# Patient Record
Sex: FEMALE | Race: BLACK OR AFRICAN AMERICAN | NOT HISPANIC OR LATINO | Employment: OTHER | URBAN - METROPOLITAN AREA
[De-identification: names, ages, dates, MRNs, and addresses within clinical notes are randomized per-mention and may not be internally consistent; named-entity substitution may affect disease eponyms.]

---

## 2023-02-28 PROBLEM — R06.09 DYSPNEA ON EXERTION: Status: ACTIVE | Noted: 2023-02-28

## 2023-02-28 PROBLEM — M54.6 THORACIC BACK PAIN: Status: ACTIVE | Noted: 2023-02-28

## 2023-02-28 PROBLEM — M25.552 LEFT HIP PAIN: Status: ACTIVE | Noted: 2023-02-28

## 2023-02-28 PROBLEM — H52.03 HYPEROPIA OF BOTH EYES: Status: ACTIVE | Noted: 2023-02-28

## 2023-02-28 PROBLEM — J30.2 SEASONAL ALLERGIC RHINITIS: Status: ACTIVE | Noted: 2023-02-28

## 2023-02-28 PROBLEM — H40.1190 CHRONIC OPEN ANGLE GLAUCOMA: Status: ACTIVE | Noted: 2023-02-28

## 2023-02-28 PROBLEM — H01.003 BLEPHARITIS, BOTH EYES: Status: ACTIVE | Noted: 2023-02-28

## 2023-02-28 PROBLEM — H52.00 HYPEROPIA: Status: ACTIVE | Noted: 2023-02-28

## 2023-02-28 PROBLEM — S46.911A STRAIN OF RIGHT SHOULDER: Status: ACTIVE | Noted: 2023-02-28

## 2023-02-28 PROBLEM — R73.09 ELEVATED GLUCOSE: Status: ACTIVE | Noted: 2023-02-28

## 2023-02-28 PROBLEM — M25.569 KNEE PAIN: Status: ACTIVE | Noted: 2023-02-28

## 2023-02-28 PROBLEM — G62.9 NEUROPATHY: Status: ACTIVE | Noted: 2023-02-28

## 2023-02-28 PROBLEM — H01.006 BLEPHARITIS, BOTH EYES: Status: ACTIVE | Noted: 2023-02-28

## 2023-02-28 PROBLEM — H25.812 COMBINED FORM OF AGE-RELATED CATARACT, LEFT EYE: Status: ACTIVE | Noted: 2023-02-28

## 2023-02-28 PROBLEM — M13.162 INFLAMMATION OF JOINT OF LEFT KNEE: Status: ACTIVE | Noted: 2023-02-28

## 2023-02-28 PROBLEM — I25.10 ARTERIOSCLEROTIC CORONARY ARTERY DISEASE: Status: ACTIVE | Noted: 2023-02-28

## 2023-02-28 PROBLEM — R00.1 SINUS BRADYCARDIA: Status: ACTIVE | Noted: 2023-02-28

## 2023-02-28 PROBLEM — M17.9 OSTEOARTHRITIS OF KNEE: Status: ACTIVE | Noted: 2023-02-28

## 2023-02-28 PROBLEM — M54.50 LOW BACK PAIN: Status: ACTIVE | Noted: 2023-02-28

## 2023-02-28 PROBLEM — E78.5 HYPERLIPIDEMIA: Status: ACTIVE | Noted: 2023-02-28

## 2023-02-28 PROBLEM — H10.13 ALLERGIC CONJUNCTIVITIS OF BOTH EYES: Status: ACTIVE | Noted: 2023-02-28

## 2023-02-28 PROBLEM — Z96.60 HISTORY OF JOINT REPLACEMENT: Status: ACTIVE | Noted: 2023-02-28

## 2023-02-28 PROBLEM — H25.811 COMBINED FORM OF AGE-RELATED CATARACT, RIGHT EYE: Status: ACTIVE | Noted: 2023-02-28

## 2023-02-28 PROBLEM — H40.9 GLAUCOMA: Status: ACTIVE | Noted: 2023-02-28

## 2023-02-28 PROBLEM — K80.20 CHOLELITHIASIS: Status: ACTIVE | Noted: 2023-02-28

## 2023-02-28 PROBLEM — H40.2220 CHRONIC PRIMARY ANGLE-CLOSURE GLAUCOMA OF LEFT EYE: Status: ACTIVE | Noted: 2023-02-28

## 2023-02-28 PROBLEM — R93.1 AGATSTON CORONARY ARTERY CALCIUM SCORE BETWEEN 200 AND 399: Status: ACTIVE | Noted: 2023-02-28

## 2023-02-28 PROBLEM — I35.8 AORTIC VALVE SCLEROSIS: Status: ACTIVE | Noted: 2023-02-28

## 2023-02-28 PROBLEM — M54.16 LUMBAR RADICULOPATHY: Status: ACTIVE | Noted: 2023-02-28

## 2023-02-28 PROBLEM — H40.1123 PRIMARY OPEN ANGLE GLAUCOMA OF LEFT EYE, SEVERE STAGE: Status: ACTIVE | Noted: 2023-02-28

## 2023-02-28 PROBLEM — I10 HYPERTENSION: Status: ACTIVE | Noted: 2023-02-28

## 2023-02-28 PROBLEM — H40.2233 CHRONIC PRIMARY ANGLE-CLOSURE GLAUCOMA OF BOTH EYES, SEVERE STAGE: Status: ACTIVE | Noted: 2023-02-28

## 2023-02-28 PROBLEM — H40.1113 PRIMARY OPEN ANGLE GLAUCOMA OF RIGHT EYE, SEVERE STAGE: Status: ACTIVE | Noted: 2023-02-28

## 2023-02-28 RX ORDER — FLUTICASONE PROPIONATE 50 MCG
1 SPRAY, SUSPENSION (ML) NASAL DAILY
COMMUNITY
Start: 2021-08-09 | End: 2024-04-09 | Stop reason: SDUPTHER

## 2023-02-28 RX ORDER — NIFEDIPINE 30 MG/1
1 TABLET, FILM COATED, EXTENDED RELEASE ORAL DAILY
COMMUNITY
Start: 2022-06-16 | End: 2023-06-09 | Stop reason: SDUPTHER

## 2023-02-28 RX ORDER — LOSARTAN POTASSIUM 50 MG/1
1 TABLET ORAL DAILY
COMMUNITY
Start: 2022-06-16 | End: 2023-06-09 | Stop reason: SDUPTHER

## 2023-02-28 RX ORDER — HYDROCHLOROTHIAZIDE 25 MG/1
1 TABLET ORAL DAILY
COMMUNITY
Start: 2018-01-04 | End: 2024-02-13 | Stop reason: SDUPTHER

## 2023-02-28 RX ORDER — TIMOLOL MALEATE 5 MG/ML
SOLUTION/ DROPS OPHTHALMIC
COMMUNITY
Start: 2022-02-19 | End: 2024-05-20 | Stop reason: SDUPTHER

## 2023-02-28 RX ORDER — ATENOLOL 25 MG/1
1 TABLET ORAL DAILY
COMMUNITY
Start: 2011-11-10 | End: 2024-02-12 | Stop reason: SDUPTHER

## 2023-02-28 RX ORDER — LATANOPROST 50 UG/ML
1 SOLUTION/ DROPS OPHTHALMIC NIGHTLY
COMMUNITY
Start: 2015-08-06 | End: 2024-05-20 | Stop reason: SDUPTHER

## 2023-02-28 RX ORDER — ASPIRIN 81 MG/1
1 TABLET ORAL DAILY
COMMUNITY
Start: 2014-09-11

## 2023-02-28 RX ORDER — CETIRIZINE HYDROCHLORIDE 10 MG/1
10 TABLET ORAL EVERY MORNING
COMMUNITY
Start: 2018-04-19 | End: 2023-05-09 | Stop reason: SDUPTHER

## 2023-02-28 RX ORDER — ATORVASTATIN CALCIUM 80 MG/1
1 TABLET, FILM COATED ORAL DAILY
COMMUNITY
End: 2023-10-11 | Stop reason: SDUPTHER

## 2023-03-10 ENCOUNTER — OFFICE VISIT (OUTPATIENT)
Dept: PRIMARY CARE | Facility: CLINIC | Age: 83
End: 2023-03-10
Payer: MEDICARE

## 2023-03-10 VITALS
OXYGEN SATURATION: 98 % | BODY MASS INDEX: 27.83 KG/M2 | DIASTOLIC BLOOD PRESSURE: 69 MMHG | SYSTOLIC BLOOD PRESSURE: 148 MMHG | HEART RATE: 53 BPM | TEMPERATURE: 98.4 F | WEIGHT: 163 LBS | HEIGHT: 64 IN

## 2023-03-10 DIAGNOSIS — F43.21 GRIEF AT LOSS OF CHILD: Primary | ICD-10-CM

## 2023-03-10 DIAGNOSIS — Z63.4 GRIEF AT LOSS OF CHILD: Primary | ICD-10-CM

## 2023-03-10 PROCEDURE — 3077F SYST BP >= 140 MM HG: CPT | Performed by: STUDENT IN AN ORGANIZED HEALTH CARE EDUCATION/TRAINING PROGRAM

## 2023-03-10 PROCEDURE — 3078F DIAST BP <80 MM HG: CPT | Performed by: STUDENT IN AN ORGANIZED HEALTH CARE EDUCATION/TRAINING PROGRAM

## 2023-03-10 PROCEDURE — 99213 OFFICE O/P EST LOW 20 MIN: CPT | Performed by: STUDENT IN AN ORGANIZED HEALTH CARE EDUCATION/TRAINING PROGRAM

## 2023-03-10 SDOH — SOCIAL STABILITY - SOCIAL INSECURITY: DISSAPEARANCE AND DEATH OF FAMILY MEMBER: Z63.4

## 2023-03-10 ASSESSMENT — PAIN SCALES - GENERAL: PAINLEVEL: 0-NO PAIN

## 2023-03-10 NOTE — PROGRESS NOTES
"Subjective   Patient ID: Melita Raya is a 82 y.o. female who presents for Follow-up.    HPI  80yo female with a pmh of HTN, aortic valve stenosis, cholelithiasis, chronic open angle glaucoma, bilateral knee replacement, hyperlipidemia, and seasonal allergic rhinitis/sinusitis presenting for follow-up for acute grief reaction:    #Acute Grief Reaction  - Daughter, Eloisa, passed away mid-February and was primary caretaker  - Currently staying with son who is assisting her with household  - Tearful as she describes her daughter's chronic illness    Review of Systems   Constitutional:  Negative for chills and fever.   HENT:  Negative for congestion.    Eyes:  Negative for pain.   Respiratory:  Negative for cough, chest tightness, shortness of breath and wheezing.    Cardiovascular:  Negative for chest pain, palpitations and leg swelling.   Gastrointestinal:  Negative for abdominal distention, abdominal pain, constipation, diarrhea, nausea and vomiting.   Genitourinary:  Negative for dysuria.   Musculoskeletal:  Negative for arthralgias, back pain, joint swelling and myalgias.   Skin:  Negative for color change and rash.   Neurological:  Negative for weakness, numbness and headaches.   Psychiatric/Behavioral:  Negative for behavioral problems.        Objective     /69 (BP Location: Right arm, Patient Position: Sitting)   Pulse 53   Temp 36.9 °C (98.4 °F) (Temporal)   Ht 1.626 m (5' 4\")   Wt 73.9 kg (163 lb)   SpO2 98%   BMI 27.98 kg/m²      General: clean hygiene, appropriate affect, appears own age, no acute distress  Head: no scars/bumps, no deformities, no trauma  Eyes: EOMI, pupils are equal, round, and reactive to light  ENT: no lymphadenopathy, trachea midline  Cardiac: regular rate/rhythm, no murmurs/rubs/gallops  Pulm: clear to auscultation, bilateral entry/effort appropriate, no wheezes/rales/rhonchi  Abdominal: bowel sounds appreciated, abdomen soft/non-tender, no distention/fluid, no " hepatomegaly, no splenomegaly  : DEFERRED  Skin: No scars, no bruises, no rashes, no masses  Mental Status: awake, alert, oriented x 4, normal memory  MSK: appropriate muscle tone/bulk, active and passive ROM upper extremities and lower extremities     Assessment/Plan   Problem List Items Addressed This Visit    None    82yo female with a pmh of HTN, aortic valve stenosis, cholelithiasis, chronic open angle glaucoma, bilateral knee replacement, hyperlipidemia, and seasonal allergic rhinitis/sinusitis presenting for follow-up for acute grief reaction:    #Acute Grief Reaction  - Currently staying with son who is assisting her with household  - Not amendable to therapy or medical treatment at this time  - Discussed methods to help with stress relief    Plan to follow-up in 1 month     Patient discussed with Dr. Fred Howell MD   Resident Physician, PGY3  Family and Preventive Medicine

## 2023-03-14 ASSESSMENT — ENCOUNTER SYMPTOMS
DYSURIA: 0
HEADACHES: 0
ABDOMINAL PAIN: 0
FEVER: 0
COLOR CHANGE: 0
EYE PAIN: 0
PALPITATIONS: 0
JOINT SWELLING: 0
WHEEZING: 0
ARTHRALGIAS: 0
COUGH: 0
CHEST TIGHTNESS: 0
VOMITING: 0
CHILLS: 0
DIARRHEA: 0
NAUSEA: 0
SHORTNESS OF BREATH: 0
WEAKNESS: 0
MYALGIAS: 0
BACK PAIN: 0
NUMBNESS: 0
ABDOMINAL DISTENTION: 0
CONSTIPATION: 0

## 2023-03-14 NOTE — PROGRESS NOTES
I reviewed the case with the resident but did not see the patient.  I agree with the assessment and plan as documented in the resident's note.    Hemanth Ibarra MD

## 2023-04-18 ENCOUNTER — OFFICE VISIT (OUTPATIENT)
Dept: PRIMARY CARE | Facility: CLINIC | Age: 83
End: 2023-04-18
Payer: MEDICARE

## 2023-04-18 VITALS
HEIGHT: 64 IN | OXYGEN SATURATION: 100 % | TEMPERATURE: 97.7 F | WEIGHT: 166.2 LBS | HEART RATE: 44 BPM | BODY MASS INDEX: 28.38 KG/M2 | DIASTOLIC BLOOD PRESSURE: 71 MMHG | SYSTOLIC BLOOD PRESSURE: 149 MMHG

## 2023-04-18 DIAGNOSIS — G89.29 CHRONIC PAIN OF LEFT KNEE: Primary | ICD-10-CM

## 2023-04-18 DIAGNOSIS — M25.562 CHRONIC PAIN OF LEFT KNEE: Primary | ICD-10-CM

## 2023-04-18 DIAGNOSIS — M54.50 LOW BACK PAIN RADIATING TO LEFT LOWER EXTREMITY: ICD-10-CM

## 2023-04-18 DIAGNOSIS — M79.605 LOW BACK PAIN RADIATING TO LEFT LOWER EXTREMITY: ICD-10-CM

## 2023-04-18 PROCEDURE — 1036F TOBACCO NON-USER: CPT | Performed by: STUDENT IN AN ORGANIZED HEALTH CARE EDUCATION/TRAINING PROGRAM

## 2023-04-18 PROCEDURE — 1159F MED LIST DOCD IN RCRD: CPT | Performed by: STUDENT IN AN ORGANIZED HEALTH CARE EDUCATION/TRAINING PROGRAM

## 2023-04-18 PROCEDURE — 3078F DIAST BP <80 MM HG: CPT | Performed by: STUDENT IN AN ORGANIZED HEALTH CARE EDUCATION/TRAINING PROGRAM

## 2023-04-18 PROCEDURE — 3077F SYST BP >= 140 MM HG: CPT | Performed by: STUDENT IN AN ORGANIZED HEALTH CARE EDUCATION/TRAINING PROGRAM

## 2023-04-18 PROCEDURE — 1157F ADVNC CARE PLAN IN RCRD: CPT | Performed by: STUDENT IN AN ORGANIZED HEALTH CARE EDUCATION/TRAINING PROGRAM

## 2023-04-18 PROCEDURE — 99213 OFFICE O/P EST LOW 20 MIN: CPT | Performed by: STUDENT IN AN ORGANIZED HEALTH CARE EDUCATION/TRAINING PROGRAM

## 2023-04-18 ASSESSMENT — ENCOUNTER SYMPTOMS
WEAKNESS: 0
NAUSEA: 0
JOINT SWELLING: 0
HEADACHES: 0
CONSTIPATION: 0
ABDOMINAL DISTENTION: 0
MYALGIAS: 1
COLOR CHANGE: 0
CHILLS: 0
CHEST TIGHTNESS: 0
EYE PAIN: 0
FEVER: 0
VOMITING: 0
SHORTNESS OF BREATH: 0
ARTHRALGIAS: 1
COUGH: 0
DYSURIA: 0
NUMBNESS: 0
ABDOMINAL PAIN: 0
BACK PAIN: 1
PALPITATIONS: 0
DIARRHEA: 0
WHEEZING: 0

## 2023-04-18 ASSESSMENT — PAIN SCALES - GENERAL: PAINLEVEL: 0-NO PAIN

## 2023-04-18 NOTE — PROGRESS NOTES
"Subjective   Patient ID: Melita Raya is a 82 y.o. female who presents for Follow-up.    HPI    83yo female with a pmh of HTN, aortic valve stenosis, cholelithiasis, chronic open angle glaucoma, bilateral knee replacement, hyperlipidemia, and seasonal allergic rhinitis/sinusitis presenting for follow-up of acute on chronic left knee pain and lower left back pain. Pain is getting worse alternating with Tylenol and Ibuprofen.     #Left Lower Back Pain  #Left Leg Pain  - Feels every day like dull pain  - Feels it the worst after she has walked up and down stairs for a while; feels stiff in the morning but better after stretches  - Left hip xray showed osteoarthritis  - Takes ibuprofen for 1 week during flares    #Left Knee Pain  - Feels every day  - Has had prior knee replacements bilaterally, with right knee feeling well  - There is no swelling  - No fever, chills, SOB, CP, Heart Palpitations, no recent illnesses    Review of Systems   Constitutional:  Negative for chills and fever.   HENT:  Negative for congestion.    Eyes:  Negative for pain.   Respiratory:  Negative for cough, chest tightness, shortness of breath and wheezing.    Cardiovascular:  Negative for chest pain, palpitations and leg swelling.   Gastrointestinal:  Negative for abdominal distention, abdominal pain, constipation, diarrhea, nausea and vomiting.   Genitourinary:  Negative for dysuria.   Musculoskeletal:  Positive for arthralgias, back pain and myalgias. Negative for joint swelling.   Skin:  Negative for color change and rash.   Neurological:  Negative for weakness, numbness and headaches.   Psychiatric/Behavioral:  Negative for behavioral problems.        Objective     /71 (BP Location: Left arm, Patient Position: Sitting)   Pulse (!) 44   Temp 36.5 °C (97.7 °F) (Temporal)   Ht 1.626 m (5' 4\")   Wt 75.4 kg (166 lb 3.2 oz)   SpO2 100%   BMI 28.53 kg/m²      General: clean hygiene, appropriate affect, appears own age, no acute " distress  Head: no scars/bumps, no deformities, no trauma  Eyes: EOMI, pupils are equal, round, and reactive to light  ENT: no lymphadenopathy, trachea midline  Cardiac: regular rate/rhythm, no murmurs/rubs/gallops  Pulm: clear to auscultation, bilateral entry/effort appropriate, no wheezes/rales/rhonchi  Abdominal: bowel sounds appreciated, abdomen soft/non-tender, no distention/fluid, no hepatomegaly, no splenomegaly  : DEFERRED  Skin: No scars, no bruises, no rashes, no masses  Mental Status: awake, alert, oriented x 4, normal memory  MSK: appropriate muscle tone/bulk, active and passive ROM upper extremities and lower extremities     Assessment/Plan   Problem List Items Addressed This Visit    None    82yo female with a pmh of HTN, aortic valve stenosis, cholelithiasis, chronic open angle glaucoma, bilateral knee replacement, hyperlipidemia, and seasonal allergic rhinitis/sinusitis presenting for follow-up of acute on chronic left knee pain and lower left back pain:    #Left Lower Back Pain  #Left Leg Pain  - C/w Tylenol and Ibuprofen PRN for pain  - C/w Lidocaine patches  - Sparingly using Ibuprofen for flares  - Referral to Physical Therapy and work on stretching exercises    #Left Knee Pain  - S/p Knee replacement in late 2000s  - C/w Tylenol and Ibuprofen PRN for pain  - C/w Lidocaine patches  - Sparingly using Ibuprofen for flares  - Referral to Physical Therapy and work on stretching exercises  - Referral to Ortho Knee    Plan to follow-up in 1 month     Patient discussed with Dr. Herbert Howell MD   Resident Physician, PGY3  Family and Preventive Medicine     Physical Exam  Constitutional:       Appearance: Normal appearance. She is normal weight.   HENT:      Head: Normocephalic and atraumatic.      Nose: Nose normal.   Eyes:      Extraocular Movements: Extraocular movements intact.      Conjunctiva/sclera: Conjunctivae normal.      Pupils: Pupils are equal, round, and reactive to light.    Cardiovascular:      Rate and Rhythm: Normal rate and regular rhythm.      Pulses: Normal pulses.      Heart sounds: Normal heart sounds.   Pulmonary:      Effort: Pulmonary effort is normal.      Breath sounds: Normal breath sounds.   Abdominal:      General: Abdomen is flat. Bowel sounds are normal.      Palpations: Abdomen is soft.   Musculoskeletal:         General: Tenderness present. No swelling, deformity or signs of injury. Normal range of motion.      Cervical back: Normal range of motion and neck supple.      Right lower leg: No edema.      Left lower leg: No edema.   Skin:     General: Skin is warm and dry.      Capillary Refill: Capillary refill takes less than 2 seconds.   Neurological:      General: No focal deficit present.      Mental Status: She is alert and oriented to person, place, and time. Mental status is at baseline.   Psychiatric:         Mood and Affect: Mood normal.         Behavior: Behavior normal.

## 2023-04-18 NOTE — PROGRESS NOTES
I reviewed with the resident the medical history and the resident’s findings on physical examination.  I discussed with the resident the patient’s diagnosis and concur with the treatment plan as documented in the resident note.     William Godinez MD

## 2023-05-09 DIAGNOSIS — J30.9 ALLERGIC RHINITIS, UNSPECIFIED SEASONALITY, UNSPECIFIED TRIGGER: Primary | ICD-10-CM

## 2023-05-09 RX ORDER — CETIRIZINE HYDROCHLORIDE 10 MG/1
10 TABLET ORAL EVERY MORNING
Qty: 90 TABLET | Refills: 3 | Status: SHIPPED | OUTPATIENT
Start: 2023-05-09

## 2023-06-09 DIAGNOSIS — I10 PRIMARY HYPERTENSION: Primary | ICD-10-CM

## 2023-06-11 RX ORDER — LOSARTAN POTASSIUM 50 MG/1
50 TABLET ORAL DAILY
Qty: 90 TABLET | Refills: 3 | Status: SHIPPED | OUTPATIENT
Start: 2023-06-11 | End: 2024-05-31 | Stop reason: SDUPTHER

## 2023-06-11 RX ORDER — NIFEDIPINE 30 MG/1
30 TABLET, FILM COATED, EXTENDED RELEASE ORAL DAILY
Qty: 90 TABLET | Refills: 3 | Status: SHIPPED | OUTPATIENT
Start: 2023-06-11 | End: 2024-05-31 | Stop reason: SDUPTHER

## 2023-06-13 ENCOUNTER — OFFICE VISIT (OUTPATIENT)
Dept: PRIMARY CARE | Facility: CLINIC | Age: 83
End: 2023-06-13
Payer: MEDICARE

## 2023-06-13 VITALS
DIASTOLIC BLOOD PRESSURE: 75 MMHG | BODY MASS INDEX: 28.85 KG/M2 | WEIGHT: 169 LBS | SYSTOLIC BLOOD PRESSURE: 159 MMHG | TEMPERATURE: 97.3 F | OXYGEN SATURATION: 99 % | HEIGHT: 64 IN | HEART RATE: 47 BPM

## 2023-06-13 DIAGNOSIS — M25.562 CHRONIC PAIN OF LEFT KNEE: Primary | ICD-10-CM

## 2023-06-13 DIAGNOSIS — M79.605 LOW BACK PAIN RADIATING TO LEFT LOWER EXTREMITY: ICD-10-CM

## 2023-06-13 DIAGNOSIS — G89.29 CHRONIC PAIN OF LEFT KNEE: Primary | ICD-10-CM

## 2023-06-13 DIAGNOSIS — M54.50 LOW BACK PAIN RADIATING TO LEFT LOWER EXTREMITY: ICD-10-CM

## 2023-06-13 PROCEDURE — 1159F MED LIST DOCD IN RCRD: CPT | Performed by: STUDENT IN AN ORGANIZED HEALTH CARE EDUCATION/TRAINING PROGRAM

## 2023-06-13 PROCEDURE — 3078F DIAST BP <80 MM HG: CPT | Performed by: STUDENT IN AN ORGANIZED HEALTH CARE EDUCATION/TRAINING PROGRAM

## 2023-06-13 PROCEDURE — 1157F ADVNC CARE PLAN IN RCRD: CPT | Performed by: STUDENT IN AN ORGANIZED HEALTH CARE EDUCATION/TRAINING PROGRAM

## 2023-06-13 PROCEDURE — 99213 OFFICE O/P EST LOW 20 MIN: CPT | Performed by: STUDENT IN AN ORGANIZED HEALTH CARE EDUCATION/TRAINING PROGRAM

## 2023-06-13 PROCEDURE — 1036F TOBACCO NON-USER: CPT | Performed by: STUDENT IN AN ORGANIZED HEALTH CARE EDUCATION/TRAINING PROGRAM

## 2023-06-13 PROCEDURE — 3077F SYST BP >= 140 MM HG: CPT | Performed by: STUDENT IN AN ORGANIZED HEALTH CARE EDUCATION/TRAINING PROGRAM

## 2023-06-13 ASSESSMENT — ENCOUNTER SYMPTOMS
JOINT SWELLING: 0
ABDOMINAL PAIN: 0
VOMITING: 0
COLOR CHANGE: 0
CONSTIPATION: 0
BACK PAIN: 1
DYSURIA: 0
MYALGIAS: 1
NUMBNESS: 0
EYE PAIN: 0
WEAKNESS: 0
NAUSEA: 0
FEVER: 0
CHILLS: 0
ARTHRALGIAS: 1
WHEEZING: 0
SHORTNESS OF BREATH: 0
DIARRHEA: 0
COUGH: 0
HEADACHES: 0
ABDOMINAL DISTENTION: 0
CHEST TIGHTNESS: 0
PALPITATIONS: 0

## 2023-06-13 ASSESSMENT — PAIN SCALES - GENERAL: PAINLEVEL: 6

## 2023-06-13 NOTE — PROGRESS NOTES
"Subjective   Patient ID: Melita Raya is a 82 y.o. female who presents for Follow-up.    HPI    81yo female with a pmh of HTN, aortic valve stenosis, cholelithiasis, chronic open angle glaucoma, bilateral knee replacement, hyperlipidemia, and seasonal allergic rhinitis/sinusitis presenting for follow-up of acute on chronic left knee pain and lower left back pain. Pain is primarily managed alternating with Tylenol and Ibuprofen.     #Left Lower Back Pain  #Left Leg Pain  - Feels every day like dull pain  - Feels it the worst after she has walked up and down stairs for a while; feels stiff in the morning but better after stretches  - Left hip xray showed osteoarthritis  - Takes ibuprofen for 1 week during flares but not daily    #Left Knee Pain  - Feels every day  - Has had prior knee replacements bilaterally, with right knee feeling well  - There is no swelling  - No fever, chills, SOB, CP, Heart Palpitations, no recent illnesses    Review of Systems   Constitutional:  Negative for chills and fever.   HENT:  Negative for congestion.    Eyes:  Negative for pain.   Respiratory:  Negative for cough, chest tightness, shortness of breath and wheezing.    Cardiovascular:  Negative for chest pain, palpitations and leg swelling.   Gastrointestinal:  Negative for abdominal distention, abdominal pain, constipation, diarrhea, nausea and vomiting.   Genitourinary:  Negative for dysuria.   Musculoskeletal:  Positive for arthralgias, back pain and myalgias. Negative for joint swelling.   Skin:  Negative for color change and rash.   Neurological:  Negative for weakness, numbness and headaches.   Psychiatric/Behavioral:  Negative for behavioral problems.        Objective     /75 (BP Location: Right arm, Patient Position: Sitting)   Pulse (!) 47   Temp 36.3 °C (97.3 °F) (Temporal)   Ht 1.626 m (5' 4\")   Wt 76.7 kg (169 lb)   SpO2 99%   BMI 29.01 kg/m²      General: clean hygiene, appropriate affect, appears own age, " no acute distress  Head: no scars/bumps, no deformities, no trauma  Eyes: EOMI, pupils are equal, round, and reactive to light  ENT: no lymphadenopathy, trachea midline  Cardiac: regular rate/rhythm, no murmurs/rubs/gallops  Pulm: clear to auscultation, bilateral entry/effort appropriate, no wheezes/rales/rhonchi  Abdominal: bowel sounds appreciated, abdomen soft/non-tender, no distention/fluid, no hepatomegaly, no splenomegaly  : DEFERRED  Skin: No scars, no bruises, no rashes, no masses  Mental Status: awake, alert, oriented x 4, normal memory  MSK: appropriate muscle tone/bulk, active and passive ROM upper extremities and lower extremities     Assessment/Plan   Problem List Items Addressed This Visit    None    82yo female with a pmh of HTN, aortic valve stenosis, cholelithiasis, chronic open angle glaucoma, bilateral knee replacement, hyperlipidemia, and seasonal allergic rhinitis/sinusitis presenting for follow-up of acute on chronic left knee pain and lower left back pain:    #Left Lower Back Pain  #Left Leg Pain  - C/w Tylenol and Ibuprofen PRN for pain  - C/w Lidocaine patches  - Sparingly using Ibuprofen for flares  - Referral to Physical Therapy and work on stretching exercises    #Left Knee Pain  - S/p Knee replacement in late 2000s  - C/w Tylenol and Ibuprofen PRN for pain  - C/w Lidocaine patches  - Sparingly using Ibuprofen for flares  - Referral to Physical Therapy and work on stretching exercises pending scheduling  - Referral to Ortho Knee pending scheduling    #HTN  - Will follow-up with Cardiology next month regarding HTN medications    Plan to follow-up in 3 month     Patient discussed with Dr. Fred Howell MD   Resident Physician, PGY3  Family and Preventive Medicine     Physical Exam  Constitutional:       Appearance: Normal appearance. She is normal weight.   HENT:      Head: Normocephalic and atraumatic.      Nose: Nose normal.   Eyes:      Extraocular Movements: Extraocular  movements intact.      Conjunctiva/sclera: Conjunctivae normal.      Pupils: Pupils are equal, round, and reactive to light.   Cardiovascular:      Rate and Rhythm: Normal rate and regular rhythm.      Pulses: Normal pulses.      Heart sounds: Normal heart sounds.   Pulmonary:      Effort: Pulmonary effort is normal.      Breath sounds: Normal breath sounds.   Abdominal:      General: Abdomen is flat. Bowel sounds are normal.      Palpations: Abdomen is soft.   Musculoskeletal:         General: Tenderness present. No swelling, deformity or signs of injury. Normal range of motion.      Cervical back: Normal range of motion and neck supple.      Right lower leg: No edema.      Left lower leg: No edema.   Skin:     General: Skin is warm and dry.      Capillary Refill: Capillary refill takes less than 2 seconds.   Neurological:      General: No focal deficit present.      Mental Status: She is alert and oriented to person, place, and time. Mental status is at baseline.   Psychiatric:         Mood and Affect: Mood normal.         Behavior: Behavior normal.

## 2023-06-19 NOTE — PROGRESS NOTES
I reviewed with the resident the medical history and the resident’s findings on physical examination.  I discussed with the resident the patient’s diagnosis and concur with the treatment plan as documented in the resident note.     Hemanth Ibarra MD

## 2023-07-11 ENCOUNTER — OFFICE VISIT (OUTPATIENT)
Dept: PRIMARY CARE | Facility: CLINIC | Age: 83
End: 2023-07-11
Payer: MEDICARE

## 2023-07-11 VITALS
BODY MASS INDEX: 27.76 KG/M2 | SYSTOLIC BLOOD PRESSURE: 140 MMHG | DIASTOLIC BLOOD PRESSURE: 60 MMHG | HEART RATE: 52 BPM | WEIGHT: 162.6 LBS | HEIGHT: 64 IN | OXYGEN SATURATION: 99 %

## 2023-07-11 DIAGNOSIS — R73.03 PREDIABETES: ICD-10-CM

## 2023-07-11 DIAGNOSIS — E78.2 MIXED HYPERLIPIDEMIA: ICD-10-CM

## 2023-07-11 DIAGNOSIS — G89.29 CHRONIC PAIN OF LEFT KNEE: ICD-10-CM

## 2023-07-11 DIAGNOSIS — R53.83 OTHER FATIGUE: ICD-10-CM

## 2023-07-11 DIAGNOSIS — I35.8 AORTIC VALVE SCLEROSIS: ICD-10-CM

## 2023-07-11 DIAGNOSIS — M25.562 CHRONIC PAIN OF LEFT KNEE: ICD-10-CM

## 2023-07-11 DIAGNOSIS — R53.83 DECREASED ENERGY: ICD-10-CM

## 2023-07-11 DIAGNOSIS — E55.9 VITAMIN D DEFICIENCY: ICD-10-CM

## 2023-07-11 DIAGNOSIS — I10 PRIMARY HYPERTENSION: ICD-10-CM

## 2023-07-11 DIAGNOSIS — Z76.89 ENCOUNTER TO ESTABLISH CARE: Primary | ICD-10-CM

## 2023-07-11 LAB
ALANINE AMINOTRANSFERASE (SGPT) (U/L) IN SER/PLAS: 14 U/L (ref 7–45)
ALBUMIN (G/DL) IN SER/PLAS: 4.5 G/DL (ref 3.4–5)
ALKALINE PHOSPHATASE (U/L) IN SER/PLAS: 77 U/L (ref 33–136)
ANION GAP IN SER/PLAS: 16 MMOL/L (ref 10–20)
ASPARTATE AMINOTRANSFERASE (SGOT) (U/L) IN SER/PLAS: 21 U/L (ref 9–39)
BILIRUBIN TOTAL (MG/DL) IN SER/PLAS: 0.6 MG/DL (ref 0–1.2)
CALCIDIOL (25 OH VITAMIN D3) (NG/ML) IN SER/PLAS: 11 NG/ML
CALCIUM (MG/DL) IN SER/PLAS: 10.2 MG/DL (ref 8.6–10.6)
CARBON DIOXIDE, TOTAL (MMOL/L) IN SER/PLAS: 24 MMOL/L (ref 21–32)
CHLORIDE (MMOL/L) IN SER/PLAS: 105 MMOL/L (ref 98–107)
CHOLESTEROL (MG/DL) IN SER/PLAS: 226 MG/DL (ref 0–199)
CHOLESTEROL IN HDL (MG/DL) IN SER/PLAS: 88 MG/DL
CHOLESTEROL/HDL RATIO: 2.6
COBALAMIN (VITAMIN B12) (PG/ML) IN SER/PLAS: 521 PG/ML (ref 211–911)
CREATININE (MG/DL) IN SER/PLAS: 1.1 MG/DL (ref 0.5–1.05)
ERYTHROCYTE DISTRIBUTION WIDTH (RATIO) BY AUTOMATED COUNT: 12.8 % (ref 11.5–14.5)
ERYTHROCYTE MEAN CORPUSCULAR HEMOGLOBIN CONCENTRATION (G/DL) BY AUTOMATED: 32 G/DL (ref 32–36)
ERYTHROCYTE MEAN CORPUSCULAR VOLUME (FL) BY AUTOMATED COUNT: 93 FL (ref 80–100)
ERYTHROCYTES (10*6/UL) IN BLOOD BY AUTOMATED COUNT: 4.03 X10E12/L (ref 4–5.2)
ESTIMATED AVERAGE GLUCOSE FOR HBA1C: 108 MG/DL
FOLATE (NG/ML) IN SER/PLAS: 13.4 NG/ML
GFR FEMALE: 50 ML/MIN/1.73M2
GLUCOSE (MG/DL) IN SER/PLAS: 100 MG/DL (ref 74–99)
HEMATOCRIT (%) IN BLOOD BY AUTOMATED COUNT: 37.5 % (ref 36–46)
HEMOGLOBIN (G/DL) IN BLOOD: 12 G/DL (ref 12–16)
HEMOGLOBIN A1C/HEMOGLOBIN TOTAL IN BLOOD: 5.4 %
LDL: 122 MG/DL (ref 0–99)
LEUKOCYTES (10*3/UL) IN BLOOD BY AUTOMATED COUNT: 5.7 X10E9/L (ref 4.4–11.3)
NRBC (PER 100 WBCS) BY AUTOMATED COUNT: 0 /100 WBC (ref 0–0)
PLATELETS (10*3/UL) IN BLOOD AUTOMATED COUNT: 251 X10E9/L (ref 150–450)
POTASSIUM (MMOL/L) IN SER/PLAS: 4.4 MMOL/L (ref 3.5–5.3)
PROTEIN TOTAL: 7.4 G/DL (ref 6.4–8.2)
SODIUM (MMOL/L) IN SER/PLAS: 141 MMOL/L (ref 136–145)
THYROTROPIN (MIU/L) IN SER/PLAS BY DETECTION LIMIT <= 0.05 MIU/L: 1.38 MIU/L (ref 0.44–3.98)
TRIGLYCERIDE (MG/DL) IN SER/PLAS: 78 MG/DL (ref 0–149)
UREA NITROGEN (MG/DL) IN SER/PLAS: 29 MG/DL (ref 6–23)
VLDL: 16 MG/DL (ref 0–40)

## 2023-07-11 PROCEDURE — 1157F ADVNC CARE PLAN IN RCRD: CPT | Performed by: STUDENT IN AN ORGANIZED HEALTH CARE EDUCATION/TRAINING PROGRAM

## 2023-07-11 PROCEDURE — 1159F MED LIST DOCD IN RCRD: CPT | Performed by: STUDENT IN AN ORGANIZED HEALTH CARE EDUCATION/TRAINING PROGRAM

## 2023-07-11 PROCEDURE — 80061 LIPID PANEL: CPT

## 2023-07-11 PROCEDURE — 80053 COMPREHEN METABOLIC PANEL: CPT

## 2023-07-11 PROCEDURE — 84443 ASSAY THYROID STIM HORMONE: CPT

## 2023-07-11 PROCEDURE — 82607 VITAMIN B-12: CPT

## 2023-07-11 PROCEDURE — 99204 OFFICE O/P NEW MOD 45 MIN: CPT | Performed by: STUDENT IN AN ORGANIZED HEALTH CARE EDUCATION/TRAINING PROGRAM

## 2023-07-11 PROCEDURE — 82746 ASSAY OF FOLIC ACID SERUM: CPT

## 2023-07-11 PROCEDURE — 1036F TOBACCO NON-USER: CPT | Performed by: STUDENT IN AN ORGANIZED HEALTH CARE EDUCATION/TRAINING PROGRAM

## 2023-07-11 PROCEDURE — 83036 HEMOGLOBIN GLYCOSYLATED A1C: CPT

## 2023-07-11 PROCEDURE — 1160F RVW MEDS BY RX/DR IN RCRD: CPT | Performed by: STUDENT IN AN ORGANIZED HEALTH CARE EDUCATION/TRAINING PROGRAM

## 2023-07-11 PROCEDURE — 85027 COMPLETE CBC AUTOMATED: CPT

## 2023-07-11 PROCEDURE — 3077F SYST BP >= 140 MM HG: CPT | Performed by: STUDENT IN AN ORGANIZED HEALTH CARE EDUCATION/TRAINING PROGRAM

## 2023-07-11 PROCEDURE — 3078F DIAST BP <80 MM HG: CPT | Performed by: STUDENT IN AN ORGANIZED HEALTH CARE EDUCATION/TRAINING PROGRAM

## 2023-07-11 PROCEDURE — 82306 VITAMIN D 25 HYDROXY: CPT

## 2023-07-11 PROCEDURE — 1126F AMNT PAIN NOTED NONE PRSNT: CPT | Performed by: STUDENT IN AN ORGANIZED HEALTH CARE EDUCATION/TRAINING PROGRAM

## 2023-07-11 ASSESSMENT — PAIN SCALES - GENERAL: PAINLEVEL: 0-NO PAIN

## 2023-07-11 NOTE — PROGRESS NOTES
"Subjective   Patient ID: Melita Raya is a 82 y.o. female who presents for New Patient Visit (Establish care. ).        HPI  Est care   Pt's PMH, PSH, SH, FH , meds and allergies was obtained / reviewed and updated .   Was receiving care at resident clinic previously     83yo female with HTN, aortic valve stenosis, cholelithiasis, chronic open angle glaucoma, bilateral knee replacement, hyperlipidemia, and seasonal allergic rhinitis/sinusitis     Daughter passed away recently in Feb   Three adult children , now has 2 sons that are living   Son lives with her     Acute concerns :   Left knee pain   H./o  B/l knee replacement, remote     Upcoming appt with cards . Sees every 6m       Visit Vitals  /60   Pulse 52   Ht 1.626 m (5' 4\")   Wt 73.8 kg (162 lb 9.6 oz)   SpO2 99%   BMI 27.91 kg/m²   Smoking Status Never   BSA 1.83 m²      No LMP recorded.     Review of Systems    Constitutional : No feeling poorly / fevers/ chills / night sweats/ fatigue   Cardiovascular : No CP /Palpitations/ lower extremity edema / syncope   Respiratory : No Cough /TEIXEIRA/Dyspnea at rest   Gastrointestinal : No abd pain / N/V  No bloody stools/ melena / constipation  Endo : No polyuria/polydipsia/ muscle weakness / sluggishness   CNS: No confusion / HA/ tingling/ numbness/ weakness of extremities  Psychiatric: No anxiety/ depression/ SI/HI    All other systems have been reviewed and are negative for complaint       Physical Exam    Constitutional : Vitals reviewed. Alert and in no distress  Cardiovascular : RRR, Normal S1, S2, No pericardial rub/ gallop, no peripheral edema   Pulmonary: No respiratory distress, CTAB   MSK : Normal gait and station , strength and tone     Neurologic : CNs 2-12 grossly intact , no obvious FNDs  Psych : A,Ox3, normal mood and affect      Assessment/Plan   Diagnoses and all orders for this visit:  Encounter to establish care  Primary hypertension  -     CBC; Future  -     Comprehensive Metabolic Panel; " Future  Mixed hyperlipidemia  -     Lipid Panel; Future  Decreased energy  -     TSH with reflex to Free T4 if abnormal; Future  Other fatigue  Vitamin D deficiency  -     Vitamin D, Total; Future  -     Vitamin B12; Future  -     Folate; Future  Aortic valve sclerosis  Prediabetes  -     Hemoglobin A1C; Future  Chronic pain of left knee  -     Referral to Orthopaedic Surgery; Future         83yo female with HTN, aortic valve stenosis, cholelithiasis, chronic open angle glaucoma, bilateral knee replacement, hyperlipidemia, and seasonal allergic rhinitis/sinusitis   Age appropriate labs / labs for mgmt of chronic medical conditions ordered, further mgmt pending the results.        HTN ; no changes today   Will continue to monitor at future visits     H/O B/L knee replacement remotely with left knee pain   Xray Jan 2022 reviewed   Ortho referral     Fatigue for few months , could be from grieving of her daughter's death   R/o medical causes   Denied any depression     RTO  in 2-3 m for CPE

## 2023-07-13 ENCOUNTER — TELEPHONE (OUTPATIENT)
Dept: PRIMARY CARE | Facility: CLINIC | Age: 83
End: 2023-07-13
Payer: MEDICARE

## 2023-07-13 NOTE — TELEPHONE ENCOUNTER
----- Message from Yoly Valente MD sent at 7/13/2023  1:20 PM EDT -----  Vit D levels are low , high dose Vit D  04789 IUS to be taken once a week prescribed   After finishing high dose, need to take 2000 IUs of Vit D3 everyday - available over the counter .     Kidney function is mildly decreased, increase water intake, will recheck at fu visit     Cholesterol levels are elevated , has she been taking Atorvasttin 80mg regulalry?

## 2023-07-14 RX ORDER — ERGOCALCIFEROL 1.25 MG/1
50000 CAPSULE ORAL
COMMUNITY
End: 2023-10-18 | Stop reason: SDUPTHER

## 2023-07-14 RX ORDER — ERGOCALCIFEROL 1.25 MG/1
50000 CAPSULE ORAL
Qty: 12 CAPSULE | Refills: 0 | OUTPATIENT
Start: 2023-07-14

## 2023-07-14 NOTE — TELEPHONE ENCOUNTER
Patient is obtaining primary care from Dr. Fabiola Arita and this prescription should go to her for assessment.

## 2023-07-17 DIAGNOSIS — E55.9 VITAMIN D DEFICIENCY: Primary | ICD-10-CM

## 2023-07-17 RX ORDER — ASPIRIN 325 MG
50000 TABLET, DELAYED RELEASE (ENTERIC COATED) ORAL
Qty: 4 CAPSULE | Refills: 3 | Status: SHIPPED | OUTPATIENT
Start: 2023-07-17 | End: 2023-10-18 | Stop reason: ALTCHOICE

## 2023-10-02 ENCOUNTER — TREATMENT (OUTPATIENT)
Dept: PHYSICAL THERAPY | Facility: CLINIC | Age: 83
End: 2023-10-02
Payer: MEDICARE

## 2023-10-02 DIAGNOSIS — M54.50 LUMBAGO: Primary | ICD-10-CM

## 2023-10-02 DIAGNOSIS — G89.29 CHRONIC LOW BACK PAIN, UNSPECIFIED BACK PAIN LATERALITY, UNSPECIFIED WHETHER SCIATICA PRESENT: ICD-10-CM

## 2023-10-02 DIAGNOSIS — M54.16 LUMBAR RADICULOPATHY: ICD-10-CM

## 2023-10-02 DIAGNOSIS — M67.952 TENDINOPATHY OF LEFT GLUTEUS MEDIUS: ICD-10-CM

## 2023-10-02 DIAGNOSIS — M54.50 CHRONIC LOW BACK PAIN, UNSPECIFIED BACK PAIN LATERALITY, UNSPECIFIED WHETHER SCIATICA PRESENT: ICD-10-CM

## 2023-10-02 PROCEDURE — 97110 THERAPEUTIC EXERCISES: CPT | Mod: GP,CQ

## 2023-10-02 ASSESSMENT — ENCOUNTER SYMPTOMS
DEPRESSION: 0
OCCASIONAL FEELINGS OF UNSTEADINESS: 0
LOSS OF SENSATION IN FEET: 0

## 2023-10-02 NOTE — PROGRESS NOTES
"Physical Therapy    Physical Therapy Treatment    Patient Name: Melita Raya  MRN: [id number]  Today's Date: 10/2/2023  Time Calculation  Start Time: 1145  Stop Time: 1230  Time Calculation (min): 45 min      Assessment:  PT Assessment  Assessment Comment:  (Pt reported no pain after PRE's, increased w/ lat walking, abolished w/ Nu Step. R hip weakness noted w/ sit to stand.)    Plan:   Continue glute strength as tolerated     Current Problem  1. Lumbago        2. Tendinopathy of left gluteus medius        3. Lumbar radiculopathy            Subjective   General   \"I had a dance off with my 6 y/o granddaughter, and I am sore now.  L lateral hip/buttock               Objective   Palpable tenderneConss to L glute insertion area.                  Outcome Measures:      Treatments:  Therapeutic Exercise  Therapeutic Exercise Performed:  (Supine fig 4 ITB stretch 10x10\"/L, SKTC x 10 ea, DKTC x 20/Pball,Ball squeeze/black band Habd x 20 ea, Black band clams x 20 ea, sit to stand 2 foams, side stepping/red loop x 1, Nu Step- x 5 min)  Activity:      OP EDUCATION:       Goals:  Encounter Problems       Encounter Problems (Active)       PT Problem       .j       Start:  10/02/23                  "

## 2023-10-02 NOTE — Clinical Note
October 2, 2023     Patient: Melita Raya   YOB: 1940   Date of Visit: 10/2/2023       To Whom It May Concern:    It is my medical opinion that Melita Raya {Work release (duty restriction):65896}.    If you have any questions or concerns, please don't hesitate to call.         Sincerely,        Minerva Matos, PTA    CC: No Recipients

## 2023-10-02 NOTE — Clinical Note
October 2, 2023     Patient: Melita Raya   YOB: 1940   Date of Visit: 10/2/2023       To Whom it May Concern:    Melita Raya was seen in my clinic on 10/2/2023. She {Return to school/sport:88146}.    If you have any questions or concerns, please don't hesitate to call.         Sincerely,          Minerva Matos, PTA        CC: No Recipients

## 2023-10-09 ENCOUNTER — TREATMENT (OUTPATIENT)
Dept: PHYSICAL THERAPY | Facility: CLINIC | Age: 83
End: 2023-10-09
Payer: MEDICARE

## 2023-10-09 DIAGNOSIS — M54.50 LOW BACK PAIN, UNSPECIFIED BACK PAIN LATERALITY, UNSPECIFIED CHRONICITY, UNSPECIFIED WHETHER SCIATICA PRESENT: ICD-10-CM

## 2023-10-09 DIAGNOSIS — G89.29 CHRONIC LOW BACK PAIN, UNSPECIFIED BACK PAIN LATERALITY, UNSPECIFIED WHETHER SCIATICA PRESENT: Primary | ICD-10-CM

## 2023-10-09 DIAGNOSIS — M54.50 CHRONIC LOW BACK PAIN, UNSPECIFIED BACK PAIN LATERALITY, UNSPECIFIED WHETHER SCIATICA PRESENT: Primary | ICD-10-CM

## 2023-10-09 DIAGNOSIS — M67.952 TENDINOPATHY OF LEFT GLUTEUS MEDIUS: ICD-10-CM

## 2023-10-09 PROCEDURE — 97110 THERAPEUTIC EXERCISES: CPT | Mod: GP,CQ

## 2023-10-09 NOTE — PROGRESS NOTES
"Physical Therapy    Physical Therapy Treatment    Patient Name: Melita Raya  MRN: [id number]  Today's Date: 10/9/2023  Time Calculation  Start Time: 1100  Stop Time: 1200  Time Calculation (min): 60 min      Assessment:  PT Assessment  Assessment Comment:  (Pt was unable to bridge 2`ITB/glute pain)    Plan:   Glute/abd strength.  LAD as needed    Current Problem  1. Chronic low back pain, unspecified back pain laterality, unspecified whether sciatica present        2. Tendinopathy of left gluteus medius        3. Low back pain, unspecified back pain laterality, unspecified chronicity, unspecified whether sciatica present            Subjective     \"I am working on my ex's.  The pain in L glute is always there but it's not always there.  It was a little sore lateral hip after Advent yesterday, but overall, I am 85% improved.\"       Objective   Decreased latoya w/ amb initially      Treatments:  Therapeutic Exercise  Therapeutic Exercise Performed:  (L/Hin8XAH stretch 3x30\", Ball squeeze/Black band Habdx20, Bridge(black)x20, Black [id number], L LAD x 3 min. PPT,DKTC/Qxxghu12 ea, Supine [id number], Pball pelvic mobs(CW/CCW) x 20 ea, Sit to [id number](2 foams), Monster/lat [id number](red))    Goals:  Encounter Problems       Encounter Problems (Active)       PT Problem       PT Goal 1       Start:  10/09/23                  "

## 2023-10-11 DIAGNOSIS — E78.5 HYPERLIPIDEMIA, UNSPECIFIED HYPERLIPIDEMIA TYPE: Primary | ICD-10-CM

## 2023-10-11 RX ORDER — ATORVASTATIN CALCIUM 80 MG/1
80 TABLET, FILM COATED ORAL DAILY
Qty: 90 TABLET | Refills: 3 | Status: SHIPPED | OUTPATIENT
Start: 2023-10-11

## 2023-10-16 ENCOUNTER — APPOINTMENT (OUTPATIENT)
Dept: PHYSICAL THERAPY | Facility: CLINIC | Age: 83
End: 2023-10-16
Payer: MEDICARE

## 2023-10-18 ENCOUNTER — OFFICE VISIT (OUTPATIENT)
Dept: PRIMARY CARE | Facility: CLINIC | Age: 83
End: 2023-10-18
Payer: MEDICARE

## 2023-10-18 VITALS
OXYGEN SATURATION: 97 % | HEIGHT: 64 IN | DIASTOLIC BLOOD PRESSURE: 65 MMHG | HEART RATE: 54 BPM | BODY MASS INDEX: 27.9 KG/M2 | SYSTOLIC BLOOD PRESSURE: 138 MMHG | WEIGHT: 163.4 LBS

## 2023-10-18 DIAGNOSIS — L30.4 INTERTRIGO: ICD-10-CM

## 2023-10-18 DIAGNOSIS — Z78.0 ASYMPTOMATIC MENOPAUSE: ICD-10-CM

## 2023-10-18 DIAGNOSIS — Z00.00 MEDICARE ANNUAL WELLNESS VISIT, SUBSEQUENT: Primary | ICD-10-CM

## 2023-10-18 PROCEDURE — 1160F RVW MEDS BY RX/DR IN RCRD: CPT | Performed by: STUDENT IN AN ORGANIZED HEALTH CARE EDUCATION/TRAINING PROGRAM

## 2023-10-18 PROCEDURE — 3078F DIAST BP <80 MM HG: CPT | Performed by: STUDENT IN AN ORGANIZED HEALTH CARE EDUCATION/TRAINING PROGRAM

## 2023-10-18 PROCEDURE — G0439 PPPS, SUBSEQ VISIT: HCPCS | Performed by: STUDENT IN AN ORGANIZED HEALTH CARE EDUCATION/TRAINING PROGRAM

## 2023-10-18 PROCEDURE — 1170F FXNL STATUS ASSESSED: CPT | Performed by: STUDENT IN AN ORGANIZED HEALTH CARE EDUCATION/TRAINING PROGRAM

## 2023-10-18 PROCEDURE — 1036F TOBACCO NON-USER: CPT | Performed by: STUDENT IN AN ORGANIZED HEALTH CARE EDUCATION/TRAINING PROGRAM

## 2023-10-18 PROCEDURE — 99213 OFFICE O/P EST LOW 20 MIN: CPT | Performed by: STUDENT IN AN ORGANIZED HEALTH CARE EDUCATION/TRAINING PROGRAM

## 2023-10-18 PROCEDURE — 1159F MED LIST DOCD IN RCRD: CPT | Performed by: STUDENT IN AN ORGANIZED HEALTH CARE EDUCATION/TRAINING PROGRAM

## 2023-10-18 PROCEDURE — 1126F AMNT PAIN NOTED NONE PRSNT: CPT | Performed by: STUDENT IN AN ORGANIZED HEALTH CARE EDUCATION/TRAINING PROGRAM

## 2023-10-18 PROCEDURE — 3075F SYST BP GE 130 - 139MM HG: CPT | Performed by: STUDENT IN AN ORGANIZED HEALTH CARE EDUCATION/TRAINING PROGRAM

## 2023-10-18 RX ORDER — CLOTRIMAZOLE AND BETAMETHASONE DIPROPIONATE 10; .64 MG/G; MG/G
1 CREAM TOPICAL 2 TIMES DAILY
Qty: 15 G | Refills: 0 | Status: SHIPPED | OUTPATIENT
Start: 2023-10-18 | End: 2023-12-17

## 2023-10-18 RX ORDER — NYSTATIN 100000 [USP'U]/G
1 POWDER TOPICAL 2 TIMES DAILY
Qty: 30 G | Refills: 11 | Status: SHIPPED | OUTPATIENT
Start: 2023-10-18 | End: 2024-01-11 | Stop reason: WASHOUT

## 2023-10-18 RX ORDER — CHOLECALCIFEROL (VITAMIN D3) 50 MCG
50 TABLET ORAL DAILY
COMMUNITY

## 2023-10-18 ASSESSMENT — ACTIVITIES OF DAILY LIVING (ADL)
DRESSING: INDEPENDENT
TAKING_MEDICATION: INDEPENDENT
GROCERY_SHOPPING: INDEPENDENT
BATHING: INDEPENDENT
MANAGING_FINANCES: INDEPENDENT
DOING_HOUSEWORK: INDEPENDENT

## 2023-10-18 ASSESSMENT — PATIENT HEALTH QUESTIONNAIRE - PHQ9
2. FEELING DOWN, DEPRESSED OR HOPELESS: NOT AT ALL
1. LITTLE INTEREST OR PLEASURE IN DOING THINGS: NOT AT ALL
SUM OF ALL RESPONSES TO PHQ9 QUESTIONS 1 AND 2: 0

## 2023-10-18 NOTE — PROGRESS NOTES
Subjective   Reason for Visit: Melita Raya is an 83 y.o. female here for a Medicare Wellness visit.     Past Medical, Surgical, and Family History reviewed and updated in chart.    Reviewed all medications by prescribing practitioner or clinical pharmacist (such as prescriptions, OTCs, herbal therapies and supplements) and documented in the medical record.    HPI  84yo female with HTN, aortic valve stenosis, cholelithiasis, chronic open angle glaucoma, bilateral knee replacement, hyperlipidemia, and seasonal allergic rhinitis/sinusitis      Is here with her son Mr. Salgado     Received covid booster a week ago and experienced covid like sx   Also has hoarseness of voice , fatigue , chills     Patient Care Team:  Yoly Valente MD as PCP - General (Family Medicine)     Review of Systems    Constitutional: no chills, no fever and no night sweats.     Eyes: no blurred vision and no eyesight problems.     ENT: no hearing loss, no nasal congestion, no nasal discharge, no hoarseness and no sore throat.     Cardiovascular: no chest pain, no intermittent leg claudication, no lower extremity edema, no palpitations and no syncope.     Respiratory: no cough, no shortness of breath during exertion, no shortness of breath at rest and no wheezing.     Gastrointestinal: no abdominal pain, no blood in stools, no constipation, no diarrhea, no melena, no nausea, no rectal pain and no vomiting.     Genitourinary: no dysuria, no change in urinary frequency, no urinary hesitancy, no feelings of urinary urgency and no vaginal discharge.     Musculoskeletal: no arthralgias, no back pain and no myalgias.     Integumentary: no new skin lesions and no rashes.     Neurological: no difficulty walking, no headache, no limb weakness, no numbness and no tingling.     Psychiatric: no anxiety, no depression, no anhedonia and no substance use disorders.     Endocrine: no recent weight gain and no recent weight loss.  "    Hematologic/Lymphatic: no tendency for easy bruising and no swollen glands.          All other systems have been reviewed and are negative for complaint.    Objective   Vitals:  /65   Pulse 54   Ht 1.626 m (5' 4\")   Wt 74.1 kg (163 lb 6.4 oz)   SpO2 97%   BMI 28.05 kg/m²       Physical Exam    Constitutional: Alert and in no acute distress. Well developed, well nourished.     Eyes: Normal external exam. Pupils were equal in size, round, reactive to light (PERRL) with normal accommodation and extraocular movements intact (EOMI).     Ears, Nose, Mouth, and Throat: External inspection of ears and nose: Normal.  Otoscopic examination: Normal.      Neck: No neck mass was observed. Supple.     Cardiovascular: Heart rate and rhythm were normal, normal S1 and S2, no gallops, no murmurs and no pericardial rub    Pulmonary: No respiratory distress. Clear bilateral breath sounds.     Abdomen: Soft nontender; no abdominal mass palpated. No organomegaly.     Musculoskeletal: No joint swelling seen, normal movements of all extremities. Range of motion: Normal.  Muscle strength/tone: Normal.          Neurologic: Deep tendon reflexes were 2+ and symmetric. Sensation: Normal.     Psychiatric: Judgment and insight: Intact. Mood and affect: Normal.      Assessment/Plan   Problem List Items Addressed This Visit    None  Visit Diagnoses       Medicare annual wellness visit, subsequent    -  Primary    Asymptomatic menopause        Relevant Orders    XR DEXA bone density    Intertrigo        Relevant Medications    nystatin (Mycostatin) 100,000 unit/gram powder    clotrimazole-betamethasone (Lotrisone) cream          82yo female with HTN, aortic valve stenosis, cholelithiasis, chronic open angle glaucoma, bilateral knee replacement, hyperlipidemia, and seasonal allergic rhinitis/sinusitis       Likely post vaccination sx after receiving the latest covid booster   Rash under the left breast consistent with intertrigo "   Under the right breast  noted as well, pt was not aware . This is erythematous but no vesicles noted     Immunizations : UTD  Influenza :   Prevnar 20 :    Pneumovax 23:   Shingles:     Cancer screenings:  none indicated at this age     Osteoporosis :   ordered

## 2023-11-08 ENCOUNTER — HOSPITAL ENCOUNTER (OUTPATIENT)
Dept: RADIOLOGY | Facility: HOSPITAL | Age: 83
Discharge: HOME | End: 2023-11-08
Payer: MEDICARE

## 2023-11-08 DIAGNOSIS — Z78.0 ASYMPTOMATIC MENOPAUSE: ICD-10-CM

## 2023-11-08 PROCEDURE — 77085 DXA BONE DENSITY AXL VRT FX: CPT

## 2024-01-11 ENCOUNTER — OFFICE VISIT (OUTPATIENT)
Dept: CARDIOLOGY | Facility: CLINIC | Age: 84
End: 2024-01-11
Payer: MEDICARE

## 2024-01-11 VITALS
HEART RATE: 50 BPM | SYSTOLIC BLOOD PRESSURE: 124 MMHG | OXYGEN SATURATION: 97 % | WEIGHT: 171.3 LBS | HEIGHT: 64 IN | BODY MASS INDEX: 29.24 KG/M2 | DIASTOLIC BLOOD PRESSURE: 70 MMHG

## 2024-01-11 DIAGNOSIS — R00.1 SINUS BRADYCARDIA: ICD-10-CM

## 2024-01-11 DIAGNOSIS — E78.2 MIXED HYPERLIPIDEMIA: ICD-10-CM

## 2024-01-11 DIAGNOSIS — I25.10 ARTERIOSCLEROTIC CORONARY ARTERY DISEASE: Primary | ICD-10-CM

## 2024-01-11 DIAGNOSIS — I10 PRIMARY HYPERTENSION: ICD-10-CM

## 2024-01-11 DIAGNOSIS — R93.1 AGATSTON CORONARY ARTERY CALCIUM SCORE BETWEEN 200 AND 399: ICD-10-CM

## 2024-01-11 PROCEDURE — 1036F TOBACCO NON-USER: CPT | Performed by: INTERNAL MEDICINE

## 2024-01-11 PROCEDURE — 1160F RVW MEDS BY RX/DR IN RCRD: CPT | Performed by: INTERNAL MEDICINE

## 2024-01-11 PROCEDURE — 99213 OFFICE O/P EST LOW 20 MIN: CPT | Performed by: INTERNAL MEDICINE

## 2024-01-11 PROCEDURE — 3074F SYST BP LT 130 MM HG: CPT | Performed by: INTERNAL MEDICINE

## 2024-01-11 PROCEDURE — 1126F AMNT PAIN NOTED NONE PRSNT: CPT | Performed by: INTERNAL MEDICINE

## 2024-01-11 PROCEDURE — 93010 ELECTROCARDIOGRAM REPORT: CPT | Performed by: INTERNAL MEDICINE

## 2024-01-11 PROCEDURE — 3078F DIAST BP <80 MM HG: CPT | Performed by: INTERNAL MEDICINE

## 2024-01-11 PROCEDURE — 1159F MED LIST DOCD IN RCRD: CPT | Performed by: INTERNAL MEDICINE

## 2024-01-11 PROCEDURE — 93005 ELECTROCARDIOGRAM TRACING: CPT | Performed by: INTERNAL MEDICINE

## 2024-01-11 RX ORDER — EZETIMIBE 10 MG/1
10 TABLET ORAL DAILY
COMMUNITY
Start: 2023-12-31

## 2024-01-11 ASSESSMENT — PATIENT HEALTH QUESTIONNAIRE - PHQ9
1. LITTLE INTEREST OR PLEASURE IN DOING THINGS: NOT AT ALL
2. FEELING DOWN, DEPRESSED OR HOPELESS: NOT AT ALL
SUM OF ALL RESPONSES TO PHQ9 QUESTIONS 1 AND 2: 0

## 2024-01-11 ASSESSMENT — COLUMBIA-SUICIDE SEVERITY RATING SCALE - C-SSRS
2. HAVE YOU ACTUALLY HAD ANY THOUGHTS OF KILLING YOURSELF?: NO
1. IN THE PAST MONTH, HAVE YOU WISHED YOU WERE DEAD OR WISHED YOU COULD GO TO SLEEP AND NOT WAKE UP?: NO
6. HAVE YOU EVER DONE ANYTHING, STARTED TO DO ANYTHING, OR PREPARED TO DO ANYTHING TO END YOUR LIFE?: NO

## 2024-01-11 ASSESSMENT — ENCOUNTER SYMPTOMS
LOSS OF SENSATION IN FEET: 0
DEPRESSION: 0
OCCASIONAL FEELINGS OF UNSTEADINESS: 0

## 2024-01-11 ASSESSMENT — PAIN SCALES - GENERAL: PAINLEVEL: 0-NO PAIN

## 2024-01-11 NOTE — PATIENT INSTRUCTIONS
You were seen in the [facility] for your coronary arteriosclerosis and high cholesterol.     Your CT calcium score in 2018 showed a level of 381 which is moderate and shows you have some hardening of the heart arteries. To protect you from having a heart attack we are doin. Aspirin 81 mg a day for life  2. Atorvastatin 80 mg a day. This is strong cholesterol will help protect you from having a heart attack and remove cholesterol plaque from your heart arteries. Your  cholesterol blood work shows that your LDL increased to 122 from 102 on 2022 cholesterol blood.   3. Ezetimibe 10 mg a day for your cholesterol.  4. Continue nifedipine ER 30 mg a day for blood pressure.   5. Continue hydrochlorothiazide 25 mg a day for blood pressure  6. Continue atenolol to 25 mg a day for blood pressure.  7. Continue losartan 50 mg a day for blood pressure.    I ordered repeat fasting cholesterol blood work for you to get now. This repeat cholesterol lab work will check how you are responding to ezetimibe 10 mg a day started in 2023.     Your echocardiogram from 2018 showed no major abnormalities. Your aortic valve has some mild calcium build up on it but is unchanged from 2014. This is unlikely to ever be a problem. Your heart murmur is soft. I do not recommend repeat imaging at this time.     Continue to eat a heart healthy diet with low saturated fat and cholesterol. Walk 30 minutes 3 times a week to protect your heart. Your lifestyle program changes in  resulted in well controlled blood pressure and cholesterol and 15 pounds of weight loss.     Follow up with Dr. Hickman in 6 months.    141

## 2024-01-11 NOTE — PROGRESS NOTES
Subjective   Melita Raya is a 83 y.o. female who presents to the [facility] for follow up of dyslipidemia, hypertension and aortic valve sclerosis. Last seen in 2023.     Since last visit, Ms. Raya has stable cardiac status with CHF symptoms (PND, orthopnea), chest pain, palpitations, claudication, or syncope. She notes less exertional dyspnea and back pain after physical therapy and lifestyle modification program changes to diet and aerobic exercise with 15 lb weight loss in . Now home BP trend 120s/70s mm Hg on review of home log book.     LDL up to 122 from prior 102 on 2023 lipid panel. Ezetimibe 10 mg a day started at July visit for goal LDL < 100.      2018 CT calcium score of 381. ROUSE 10 year risk score of 21% for MI based on risk factors at that time. We increased atorvastatin to 80 mg a day in 2019. Blood pressure at goal on current medications. Her ankle edema has resolved since starting HCTZ 25 mg a day.  has stable exertional dyspnea with climbing stairs. Has done less walking since February due to bereavement from her daughter's death.      Normal LV function on echo 2018 with stable mild aortic sclerosis without stenosis.     Past Medical History:  1. Coronary arteriosclerosis: 2018 CT calcium score of 381. ROUSE 10 year risk score of 21%  2. Hypertension  3. Dyslipidemia: 2019 lipids (atorvastatin 80 mg):  / TG 84 / HDL 71 / . Prior use of ezetimibe did not lower LDL level. Prior use of simvastatin 40 mg and 80 mg caused increase in LFTs.   4. Osteoarthritis     Past Surgical History:  1. Right knee replacement      Social History:  Quit tobacco 40 years ago. Retired from  at [employer].     Family History:  Mother and 2 sisters with CAD prior to 64 yo. Mother  at 61 yo of MI. Sister had stent 7 years ago.     Review of Systems    A 14 point review of systems was asked.  "All questions were negative except for pertinent positives listed in the HPI.      Objective   Physical Exam  BP Readings from Last 3 Encounters:   24 124/70   10/18/23 138/65   23 138/63      Wt Readings from Last 3 Encounters:   24 77.7 kg (171 lb 4.8 oz)   10/18/23 74.1 kg (163 lb 6.4 oz)   23 76.8 kg (169 lb 6 oz)      BMI: Estimated body mass index is 29.4 kg/m² as calculated from the following:    Height as of this encounter: 1.626 m (5' 4\").    Weight as of this encounter: 77.7 kg (171 lb 4.8 oz).  BSA: Estimated body surface area is 1.87 meters squared as calculated from the following:    Height as of this encounter: 1.626 m (5' 4\").    Weight as of this encounter: 77.7 kg (171 lb 4.8 oz).    General: no acute distress  HEENT: EOMI, no scleral icterus.  Lungs: Clear to auscultation bilaterally without wheezing, rales, or rhonchi.  Cardiovascular: Regular rhythm and rate. Normal S1 and S2. No murmurs, rubs, or gallops are appreciated. JVP normal.  Abdomen: Soft, nontender, nondistended. Bowel sounds present.  Extremities: Warm and well perfused with equal 2+ pulses bilaterally.  No edema present.  Neurologic: Alert and oriented x3.    I have personally reviewed the following images and laboratory findings:  Last echocardiogram: Most recent echo, 1/10/2018: LV EF 60-65%, impaired relaxation diastology (E/e' 15), normal LA size (MARTIN 21), normal RV/RA, aortic valve sclerosis, trivial AI, mild MAC, trace MR, trace TR. RVSP not estimated (RA pressure ~ 3 mm Hg with normal IVC size and respiratory collapse). No changes from 2014    Last cath / stress test / CACS: 2018 CT calcium score of 381. ROUSE 10 year risk score of 21%    Most recent EC2024 ECG: Sinus rhythm, 50 bpm, normal ECG. Personally reviewed in office.    Lab Results   Component Value Date    CHOL 226 (H) 2023    CHOL 192 2022    CHOL 216 (H) 2021     Lab Results   Component Value Date    HDL 88.0 " "07/11/2023    HDL 58.7 11/28/2022    HDL 79.6 06/21/2021     No results found for: \"LDLCALC\"  Lab Results   Component Value Date    TRIG 78 07/11/2023    TRIG 157 (H) 11/28/2022    TRIG 97 06/21/2021     No components found for: \"CHOLHDL\"      Assessment/Plan   1. Hypertension:  Blood pressure ambulatory range 120-130s mm Hg. Edema resolved with starting HCTZ 25 mg daily. Continue nifedipine ER 30 mg, atenolol 25 mg a day. HR increased to 54 bpm from 44 bpm with reduction of atenolol to 25 mg at end of 2019. HR trend 50-71 bpm in last 6 months of 2022 on review of EMR vital signs.     2. Exertional dyspnea:  Has improved with blood pressure control and adding HCTZ 25 mg for mild diuresis. Likely due to hypertensive heart disease. Walking program helping with shortness of breath symptom management. Will observe.     3. Coronary arteriosclerosis / Dyslipidemia:  CT calcium score was 381 in 12/2018 with ROUSE 10 year risk score for MI of 21%. Continue atorvastatin 80 mg a day high intensity statin therapy. Repeat July 2023 lipid panel showed LDL increase to 122 from 102 in November 2022. Continue atorvastatin 80 mg a day. Started ezetimibe 10 mg a day at July 2023 visit. Goal LDL < 100.     Check repeat lipid panel now.     Lifestyle modification of low cholesterol diet and aerobic exercise (walking program) successful in 2022. Counseled to continue physical activity of 30 minutes 3 times a week for improved cardiopulmonary fitness.      Follow up with Dr. Hickman 6 months.            SIGNATURE: Negro Hickman MD PATIENT NAME: Melita Raya   DATE/TIME: January 11, 2024 1:21 PM MRN: [id number]     "

## 2024-01-12 LAB
ATRIAL RATE: 50 BPM
P AXIS: 69 DEGREES
P OFFSET: 180 MS
P ONSET: 140 MS
PR INTERVAL: 170 MS
Q ONSET: 225 MS
QRS COUNT: 9 BEATS
QRS DURATION: 74 MS
QT INTERVAL: 456 MS
QTC CALCULATION(BAZETT): 415 MS
QTC FREDERICIA: 429 MS
R AXIS: -17 DEGREES
T AXIS: 53 DEGREES
T OFFSET: 453 MS
VENTRICULAR RATE: 50 BPM

## 2024-01-24 ENCOUNTER — LAB (OUTPATIENT)
Dept: LAB | Facility: LAB | Age: 84
End: 2024-01-24
Payer: MEDICARE

## 2024-01-24 DIAGNOSIS — E78.2 MIXED HYPERLIPIDEMIA: ICD-10-CM

## 2024-01-24 DIAGNOSIS — I25.10 ARTERIOSCLEROTIC CORONARY ARTERY DISEASE: ICD-10-CM

## 2024-01-24 LAB
CHOLEST SERPL-MCNC: 198 MG/DL (ref 0–199)
CHOLESTEROL/HDL RATIO: 2.7
HDLC SERPL-MCNC: 73.4 MG/DL
LDLC SERPL CALC-MCNC: 107 MG/DL
NON HDL CHOLESTEROL: 125 MG/DL (ref 0–149)
TRIGL SERPL-MCNC: 88 MG/DL (ref 0–149)
VLDL: 18 MG/DL (ref 0–40)

## 2024-01-24 PROCEDURE — 36415 COLL VENOUS BLD VENIPUNCTURE: CPT

## 2024-01-24 PROCEDURE — 80061 LIPID PANEL: CPT

## 2024-02-12 DIAGNOSIS — I10 PRIMARY HYPERTENSION: Primary | ICD-10-CM

## 2024-02-12 RX ORDER — ATENOLOL 25 MG/1
25 TABLET ORAL DAILY
Qty: 90 TABLET | Refills: 3 | Status: SHIPPED | OUTPATIENT
Start: 2024-02-12

## 2024-02-13 DIAGNOSIS — I10 PRIMARY HYPERTENSION: Primary | ICD-10-CM

## 2024-02-13 RX ORDER — HYDROCHLOROTHIAZIDE 25 MG/1
25 TABLET ORAL DAILY
Qty: 90 TABLET | Refills: 3 | Status: SHIPPED | OUTPATIENT
Start: 2024-02-13

## 2024-03-29 ENCOUNTER — OFFICE VISIT (OUTPATIENT)
Dept: PRIMARY CARE | Facility: CLINIC | Age: 84
End: 2024-03-29
Payer: MEDICARE

## 2024-03-29 VITALS
DIASTOLIC BLOOD PRESSURE: 75 MMHG | BODY MASS INDEX: 29.67 KG/M2 | SYSTOLIC BLOOD PRESSURE: 153 MMHG | HEART RATE: 54 BPM | HEIGHT: 64 IN | WEIGHT: 173.8 LBS | OXYGEN SATURATION: 97 %

## 2024-03-29 DIAGNOSIS — M54.16 LEFT LUMBAR RADICULOPATHY: Primary | ICD-10-CM

## 2024-03-29 DIAGNOSIS — M65.80 CALCIFICATION OF TENDON: ICD-10-CM

## 2024-03-29 PROCEDURE — 99214 OFFICE O/P EST MOD 30 MIN: CPT | Performed by: STUDENT IN AN ORGANIZED HEALTH CARE EDUCATION/TRAINING PROGRAM

## 2024-03-29 PROCEDURE — 1036F TOBACCO NON-USER: CPT | Performed by: STUDENT IN AN ORGANIZED HEALTH CARE EDUCATION/TRAINING PROGRAM

## 2024-03-29 PROCEDURE — 3078F DIAST BP <80 MM HG: CPT | Performed by: STUDENT IN AN ORGANIZED HEALTH CARE EDUCATION/TRAINING PROGRAM

## 2024-03-29 PROCEDURE — 1157F ADVNC CARE PLAN IN RCRD: CPT | Performed by: STUDENT IN AN ORGANIZED HEALTH CARE EDUCATION/TRAINING PROGRAM

## 2024-03-29 PROCEDURE — 1159F MED LIST DOCD IN RCRD: CPT | Performed by: STUDENT IN AN ORGANIZED HEALTH CARE EDUCATION/TRAINING PROGRAM

## 2024-03-29 PROCEDURE — 3077F SYST BP >= 140 MM HG: CPT | Performed by: STUDENT IN AN ORGANIZED HEALTH CARE EDUCATION/TRAINING PROGRAM

## 2024-03-29 PROCEDURE — 1160F RVW MEDS BY RX/DR IN RCRD: CPT | Performed by: STUDENT IN AN ORGANIZED HEALTH CARE EDUCATION/TRAINING PROGRAM

## 2024-03-29 RX ORDER — METHYLPREDNISOLONE 4 MG/1
TABLET ORAL
Qty: 21 TABLET | Refills: 0 | Status: SHIPPED | OUTPATIENT
Start: 2024-03-29 | End: 2024-04-05

## 2024-03-29 NOTE — PROGRESS NOTES
"Subjective   Patient ID: Melita Raya is a 83 y.o. female who presents for Follow-up (Pain on left side down body and growth on left index finger. ).        HPI    Pain , left lower back with radiation to the LLE    Starts off \" as crawling \" , worsens , prolonged standing worsens the pain   DJD on Xray in 2020   PT last fall       Growth on the PIP of the left index finger on the dorsal side         Visit Vitals  /75   Pulse 54   Ht 1.626 m (5' 4\")   Wt 78.8 kg (173 lb 12.8 oz)   SpO2 97%   BMI 29.83 kg/m²   Smoking Status Never   BSA 1.89 m²      No LMP recorded.     Review of Systems    Constitutional : No feeling poorly / fevers/ chills / night sweats/ fatigue   Cardiovascular : No CP /Palpitations/ lower extremity edema / syncope   Respiratory : No Cough /TEIXEIRA/Dyspnea at rest   Gastrointestinal : No abd pain / N/V  No bloody stools/ melena / constipation  Endo : No polyuria/polydipsia/ muscle weakness / sluggishness   CNS: No confusion / HA/ tingling/ numbness/ weakness of extremities  Psychiatric: No anxiety/ depression/ SI/HI    All other systems have been reviewed and are negative for complaint       Physical Exam    Constitutional : Vitals reviewed. Alert and in no distress  Cardiovascular : RRR, Normal S1, S2, No pericardial rub/ gallop, no peripheral edema   Pulmonary: No respiratory distress, CTAB   MSK : Normal gait and station , strength and tone     Neurologic : CNs 2-12 grossly intact , no obvious FNDs  Psych : A,Ox3, normal mood and affect      Assessment/Plan   Diagnoses and all orders for this visit:  Left lumbar radiculopathy  -     MR lumbar spine wo IV contrast; Future  -     methylPREDNISolone (Medrol Dospak) 4 mg tablets; Follow schedule on package instructions  -     Referral to Physical Therapy; Future        84yo female with HTN, aortic valve stenosis, cholelithiasis, chronic open angle glaucoma, bilateral knee replacement, hyperlipidemia, and seasonal allergic " rhinitis/sinusitis        LEFt L radiculopathy   E & M as above   Area of concerns as noted in physical exam : calcified tendon vs ganglion cyst   RTO in Oct for MCW        Conditions addressed and mgmt as noted above.  Pertinent labs, images/ imaging reports , chart review was done .   Age appropriate labs / labs for mgmt of chronic medical conditions ordered, further mgmt pending the results.

## 2024-04-09 DIAGNOSIS — J30.89 ENVIRONMENTAL AND SEASONAL ALLERGIES: Primary | ICD-10-CM

## 2024-04-09 RX ORDER — FLUTICASONE PROPIONATE 50 MCG
1 SPRAY, SUSPENSION (ML) NASAL DAILY
Qty: 16 G | Refills: 3 | Status: SHIPPED | OUTPATIENT
Start: 2024-04-09

## 2024-04-12 ENCOUNTER — HOSPITAL ENCOUNTER (OUTPATIENT)
Dept: RADIOLOGY | Facility: CLINIC | Age: 84
Discharge: HOME | End: 2024-04-12
Payer: MEDICARE

## 2024-04-12 DIAGNOSIS — M54.16 LEFT LUMBAR RADICULOPATHY: ICD-10-CM

## 2024-04-12 PROCEDURE — 72148 MRI LUMBAR SPINE W/O DYE: CPT | Performed by: RADIOLOGY

## 2024-04-12 PROCEDURE — 72148 MRI LUMBAR SPINE W/O DYE: CPT

## 2024-04-16 DIAGNOSIS — M54.16 LUMBAR RADICULOPATHY, CHRONIC: Primary | ICD-10-CM

## 2024-04-29 ENCOUNTER — EVALUATION (OUTPATIENT)
Dept: PHYSICAL THERAPY | Facility: CLINIC | Age: 84
End: 2024-04-29
Payer: MEDICARE

## 2024-04-29 DIAGNOSIS — M54.16 LEFT LUMBAR RADICULOPATHY: Primary | ICD-10-CM

## 2024-04-29 PROCEDURE — 97161 PT EVAL LOW COMPLEX 20 MIN: CPT | Mod: GP

## 2024-04-29 PROCEDURE — 97110 THERAPEUTIC EXERCISES: CPT | Mod: GP

## 2024-04-29 NOTE — PROGRESS NOTES
Physical Therapy Evaluation    Patient Name: Melita Raya  MRN: [id number]  Today's Date: 4/29/2024  Time Calculation  Start Time: 1305  Stop Time: 1345  Time Calculation (min): 40 min  PT Evaluation Time Entry  PT Evaluation (Low) Time Entry: 20  PT Therapeutic Procedures Time Entry  Therapeutic Exercise Time Entry: 20        Insurance: [facility] Medicare  Authorization: [employer]  Plan of Care: 4/29/24 to 7/28/24  Visit #1    Referring MD Yoly Valente  Imaging Performed MRI showed multilevel degenerative changes in lumbar spine, severe spinal canal narrowing and mild right neural foramina narrowing.     Assessment     Melita Raya is a 83 y.o. referred for low back and LLE radicular symptoms. Patient demonstrates decreased lumbar ROM, decreased LE strength, altered gait mechanics, and pain. At this time, patient is limited with standing and walking tolerance, sleeping, performing cooking tasks, walking for exercise, and generally being as active as she would like. Patient will benefit from physical therapy services to address stated impairments and improve functional mobility.    Plan  Treatment/Interventions: Cryotherapy, Education/ Instruction, Hot pack, Gait training, Manual therapy, Neuromuscular re-education, Self care/ home management, Therapeutic exercises  PT Plan: Skilled PT  PT Frequency: 1 time per week  Duration: 10 weeks  Onset Date: 01/01/22  Certification Period Start Date: 04/29/24  Certification Period End Date: 07/28/24  Number of Treatments Authorized: [employer]  Rehab Potential: Fair  Plan of Care Agreement: Patient    Primary diagnosis: Left lumbar radiculopathy  Current Problem  1. Left lumbar radiculopathy  Referral to Physical Therapy    Follow Up In Physical Therapy          General:  General  Reason for Referral: Low back pain with LLE sx  Referred By: Ambrosio  Past Medical History Relevant to Rehab: Came to PT for same issue, but had to stop due to daughter's cancer dx and death;  "worsened sx since  Preferred Learning Style: verbal, visual, written  Precautions:  Precautions  STEADI Fall Risk Score (The score of 4 or more indicates an increased risk of falling): 0  Precautions Comment: HBP  Vital Signs:       Subjective:  Chief complaints: Lower back pain worsened, with pain all the way to L foot  Onset/Surgery Date: 2022  Mechanism of Injury: Insidious onset  Previous History: Yes came to PT last year  Personal Factors that may impact care: HBP     Pain:  Current: 0/10 Best: 0/10 Worst: 8/10   Location: Back and L foot   Type: \"Something crawling\"    Aggravators: standing > 30 minutes, walking   Alleviators: sitting, tylenol   Numbness/tingling? Yes    Function:   Work/Recreation: Retired   Prior Level: Limited since back pain onset a few years ago   Current limitations: walking/standing more than 30 minutes, cooking, sleeping, exercising   Condition: Worsening     Home Situation: House   Stairs: Yes, with railing   Lives with son   No concerns about home set up    Any falls in the past year No     Injuries? No    Fear of falling? No    Sleep:    Getting to sleep No   Disturbed Yes, more than 1-2 times per night   Preferred position(s): side sleeper with pillows     Goals for Therapy:    Decrease pain, avoid any kind of surgery    Objective   Palpation: Tender L > R SIJ     ROM/Flexibility:    Lumbar  Flexion: 80, pulling in HSs      Extension: 10, pain      Sidebend R / L: 25 / 15, pain toward L      Rotation R / L: 75% / 50%, pain toward L       Strength R / L:    Hip Flexion:     4+ / 4    Hip Extension:     4 / 4-     Hip Abduction:    4 / 4-    Hip Adduction:    5 / 5    Knee Extension:   5 / 5    Knee Flexion:       5 / 5    Ankle DF:             5 / 5    Ankle PF:              5 / 5     Neurological: Sensation is intact and symmetrical in BLEs.     Gait: Decreased L hip extension during gait, slight forward trunk lean       Special Tests:     Slump R / L : - / -     SLR R / L : - / - " "     Outcome Measure:    BERNARDO: 17 / 50 = 34 %      Treatment:  Therapeutic Exercise   PPT 5\" x 20   SKC 5\" x 10 R/L   LTR x 10 R/L   DKC 5\" x 5   Seated lumbar flexion 5\" x 10    Goals:  Active       PT Problem       Patient will be independent with home exercise program for home maintenance.        Start:  05/01/24    Expected End:  05/29/24            Pt will report decreased frequency of LLE symptoms to less than 25% of the day to facilitate improved standing/walking tolerance.        Start:  05/01/24    Expected End:  05/29/24            Pt will report being able to walk/stand 30 minutes without needing to sit to facilitate improved ability to do cooking/baking tasks and walk for exercise.        Start:  05/01/24    Expected End:  06/28/24            Pt will report waking up no more than once a night a few times per week due to back discomfort to indicate improved overall symptoms and functional level.        Start:  05/01/24    Expected End:  06/28/24                             "

## 2024-05-01 ASSESSMENT — ENCOUNTER SYMPTOMS
OCCASIONAL FEELINGS OF UNSTEADINESS: 0
LOSS OF SENSATION IN FEET: 0
DEPRESSION: 0

## 2024-05-06 NOTE — PROGRESS NOTES
It was a pleasure to see Ms. Raya at the [facility] at [facility].     She is a really nice 83 y.o. female  who presents to us with complaints LOW BACK PAIN radiation to LEFT LEG  that started about  3  year  ago, and have been gradually worsening since that time.  The symptoms started after no known injury    The pain is 8 /10. The pain is described as burning, soreness, stiffness, and tingling and occurs intermittently.  Symptoms are exacerbated by standing. Factors which relieve the pain include acetaminophen, change in body position, and exercise      Numbness and/or tingling - YES left leg    Weakness : NO    Bladder/Bowel symptoms - NO    The patient has tried medications (eg: gabapentin, NSAIDS and narcotics ) : Yes Tylenol  PT : Yes    Date: Start 5/13/24  Pain Management with ESIs/selective nerve blocks  - NO    she is a NON-SMOKER and NON-DIABETIC    History of Depression : NO    PRIOR SPINE SURGERY: NO    use of Aspirin, Coumadin, or Plavix or any other anticoagulants Baby ASA     NARCOTICS for pain : NO    Part of this patient’s history is from personal review of the patient’s previous charts.      Past Medical History:   Diagnosis Date    Age-related nuclear cataract, bilateral 03/16/2016    Cataract, nuclear sclerotic, both eyes    Other conditions influencing health status     Osteoarthritis    Preglaucoma, unspecified, bilateral 09/02/2015    Glaucoma suspect of both eyes    Primary open-angle glaucoma, bilateral, severe stage 07/12/2022    Primary open angle glaucoma of both eyes, severe stage    Primary open-angle glaucoma, unspecified eye, stage unspecified 03/16/2016    Primary open angle glaucoma           Current Outpatient Medications:     aspirin 81 mg EC tablet, Take 1 tablet (81 mg) by mouth once daily., Disp: , Rfl:     atenolol (Tenormin) 25 mg tablet, Take 1 tablet (25 mg) by mouth once daily., Disp: 90 tablet, Rfl: 3    atorvastatin (Lipitor) 80 mg tablet,  Take 1 tablet (80 mg) by mouth once daily., Disp: 90 tablet, Rfl: 3    cetirizine (ZyrTEC) 10 mg tablet, Take 1 tablet (10 mg) by mouth once daily in the morning., Disp: 90 tablet, Rfl: 3    cholecalciferol (Vitamin D-3) 50 MCG (2000 UT) tablet, Take 1 tablet (50 mcg) by mouth once daily., Disp: , Rfl:     ezetimibe (Zetia) 10 mg tablet, Take 1 tablet (10 mg) by mouth once daily., Disp: , Rfl:     fluticasone (Flonase) 50 mcg/actuation nasal spray, Administer 1 spray into each nostril once daily., Disp: 16 g, Rfl: 3    hydroCHLOROthiazide (HYDRODiuril) 25 mg tablet, Take 1 tablet (25 mg) by mouth once daily., Disp: 90 tablet, Rfl: 3    latanoprost (Xalatan) 0.005 % ophthalmic solution, Administer 1 drop into both eyes once daily at bedtime., Disp: , Rfl:     losartan (Cozaar) 50 mg tablet, Take 1 tablet (50 mg) by mouth once daily., Disp: 90 tablet, Rfl: 3    NIFEdipine CC (Adalat CC) 30 mg 24 hr tablet, Take 1 tablet (30 mg) by mouth once daily., Disp: 90 tablet, Rfl: 3    timolol (Timoptic) 0.5 % ophthalmic solution, Administer into affected eye(s). Instill one gtt o.u. qam, Disp: , Rfl:       Review of Systems :   Constitutional: No fever or chills  Respiratory: No shortness of breath or wheezing  Musculoskeletal: see HPI above   Neurologic: See HPI above        EXAM:   There were no vitals filed for this visit.  NEURO: Neurologically patient is awake alert and oriented X 3    No obvious cranial nerve deficit.  Strength is almost 5/5 throughout all motor groups tested with no asymmetric significant motor deficit.  Deep tendon reflexes are symmetric throughout.   Gait : WNL   Sensory examination is intact to touch and painful stimuli.      IMAGING:   MRI of the lumbar spine done on 4/12/2024 and available to review on the PACS was evaluated personally and shows presence of severe L4-5 spinal stenosis with extremely severe nerve root compression.    ASSESSMENT AND PLAN:  Ms. Raya is a really nice 83-year-old  lady who has signs and symptoms of neurogenic claudication and radiculopathy.  Her MRI shows a severe L4-5 stenosis that is amenable to surgical intervention.  She is scheduled to see another session of physical therapy in a week or so.  At this point of time she feels like she would want to try therapy again even though she has been doing that off and on for 3 years and continues to be symptomatic.  Given the fact that she has no neurological deficit I think that is reasonable if she would want to pursue that option.  Having said that she would be a very good candidate for a minimally invasive outpatient lumbar laminectomy if she fails physical therapy and feels that her symptoms are bad enough that she cannot continue her activity of daily living.  I will be happy to see her back if she continues to be symptomatic despite all conservative treatment to discuss surgical options in details as I do believe the patient has a pathology that is amenable to surgery with good overall long term outcome.    It was a pleasure to participate in Ms. Raya clinical care. All questions were answered to her satisfaction and she explained understanding of the further treatment plan.     Vishal Estrella MD, [facility]   of Neurological Surgery  [facility] [employer]  Attending Surgeon  Director - Minimally Invasive Spine Surgery  [place], [place]      ---Some of this note was completed using Dragon voice recognition technology and sometimes the software misinterprets words. This may include unintended errors with respect to translation of words, typographical errors or grammar errors which may not have been identified prior to finalization of the chart note. Please take this into account when reading this note---

## 2024-05-07 ENCOUNTER — OFFICE VISIT (OUTPATIENT)
Dept: NEUROSURGERY | Facility: CLINIC | Age: 84
End: 2024-05-07
Payer: MEDICARE

## 2024-05-07 VITALS
TEMPERATURE: 98.4 F | WEIGHT: 172 LBS | SYSTOLIC BLOOD PRESSURE: 122 MMHG | HEART RATE: 73 BPM | BODY MASS INDEX: 28.66 KG/M2 | HEIGHT: 65 IN | DIASTOLIC BLOOD PRESSURE: 73 MMHG

## 2024-05-07 DIAGNOSIS — M54.16 LUMBAR RADICULOPATHY, CHRONIC: Primary | ICD-10-CM

## 2024-05-07 DIAGNOSIS — M48.062 LUMBAR STENOSIS WITH NEUROGENIC CLAUDICATION: ICD-10-CM

## 2024-05-07 PROCEDURE — 99213 OFFICE O/P EST LOW 20 MIN: CPT | Performed by: NEUROLOGICAL SURGERY

## 2024-05-07 PROCEDURE — 1125F AMNT PAIN NOTED PAIN PRSNT: CPT | Performed by: NEUROLOGICAL SURGERY

## 2024-05-07 PROCEDURE — 1036F TOBACCO NON-USER: CPT | Performed by: NEUROLOGICAL SURGERY

## 2024-05-07 PROCEDURE — 3074F SYST BP LT 130 MM HG: CPT | Performed by: NEUROLOGICAL SURGERY

## 2024-05-07 PROCEDURE — 99203 OFFICE O/P NEW LOW 30 MIN: CPT | Performed by: NEUROLOGICAL SURGERY

## 2024-05-07 PROCEDURE — 1160F RVW MEDS BY RX/DR IN RCRD: CPT | Performed by: NEUROLOGICAL SURGERY

## 2024-05-07 PROCEDURE — 1159F MED LIST DOCD IN RCRD: CPT | Performed by: NEUROLOGICAL SURGERY

## 2024-05-07 PROCEDURE — 3078F DIAST BP <80 MM HG: CPT | Performed by: NEUROLOGICAL SURGERY

## 2024-05-07 PROCEDURE — 1157F ADVNC CARE PLAN IN RCRD: CPT | Performed by: NEUROLOGICAL SURGERY

## 2024-05-07 ASSESSMENT — PAIN SCALES - GENERAL: PAINLEVEL: 7

## 2024-05-14 ENCOUNTER — TREATMENT (OUTPATIENT)
Dept: PHYSICAL THERAPY | Facility: CLINIC | Age: 84
End: 2024-05-14
Payer: MEDICARE

## 2024-05-14 DIAGNOSIS — M54.16 LEFT LUMBAR RADICULOPATHY: Primary | ICD-10-CM

## 2024-05-14 PROCEDURE — 97110 THERAPEUTIC EXERCISES: CPT | Mod: GP,CQ

## 2024-05-14 NOTE — PROGRESS NOTES
"Physical Therapy    Physical Therapy Treatment    Patient Name: Melita Raya  MRN: [id number]  Today's Date: 4/29/2024  Time Calculation  Start Time: 0900  Stop Time: 0950  Time Calculation (min): 50 min     PT Therapeutic Procedures Time Entry  Therapeutic Exercise Time Entry: 40        Insurance: [facility] Medicare  Authorization: [employer]  Plan of Care: 4/29/24 to 7/28/24  Visit #2    Referring MD Yoly Valente  Imaging Performed MRI showed multilevel degenerative changes in lumbar spine, severe spinal canal narrowing and mild right neural foramina narrowing.     Assessment  Pt was able to gently progress,  pt not ready for bridges, 2` increased radicular sx's into lateral L calf.\"    Plan   Progress core strength as able.  Supine march w/ med ball overhead, 90/90 position, 90/90 tap down    Primary diagnosis: Left lumbar radiculopathy  Current Problem  1. Left lumbar radiculopathy              Subjective:  \"I am feeling about the same.  No radicular sx's this morning, I did have sx's last night.\"    Pain:  Current: 5/10, L LB         Objective   Palpation: Tender L > R SIJ        Treatment:  Therapeutic Exercise   PPT 5\" x 20   SKC 5\" x 10 R/L   LTR x 10 R/L   DKC 5\" x 5  Hip Iso's x 20  L MET for extension 10x5\"  Supine march x 10  Med ball torso pull over x 10  Pball pelvic mobs x 20 ea, CW/CCW   Seated lumbar flexion 5\" x 10  HP to LB EOS    Goals:  Active       PT Problem       Patient will be independent with home exercise program for home maintenance.        Start:  05/01/24    Expected End:  05/29/24            Pt will report decreased frequency of LLE symptoms to less than 25% of the day to facilitate improved standing/walking tolerance.        Start:  05/01/24    Expected End:  05/29/24            Pt will report being able to walk/stand 30 minutes without needing to sit to facilitate improved ability to do cooking/baking tasks and walk for exercise.        Start:  05/01/24    Expected End:  06/28/24  "           Pt will report waking up no more than once a night a few times per week due to back discomfort to indicate improved overall symptoms and functional level.        Start:  05/01/24    Expected End:  06/28/24

## 2024-05-20 ENCOUNTER — OFFICE VISIT (OUTPATIENT)
Dept: OPHTHALMOLOGY | Facility: CLINIC | Age: 84
End: 2024-05-20
Payer: MEDICARE

## 2024-05-20 DIAGNOSIS — H40.1132 PRIMARY OPEN ANGLE GLAUCOMA (POAG) OF BOTH EYES, MODERATE STAGE: Primary | ICD-10-CM

## 2024-05-20 PROCEDURE — 99214 OFFICE O/P EST MOD 30 MIN: CPT | Performed by: OPHTHALMOLOGY

## 2024-05-20 RX ORDER — LATANOPROST 50 UG/ML
1 SOLUTION/ DROPS OPHTHALMIC NIGHTLY
Qty: 2.5 ML | Refills: 11 | Status: SHIPPED | OUTPATIENT
Start: 2024-05-20

## 2024-05-20 RX ORDER — TIMOLOL MALEATE 5 MG/ML
1 SOLUTION/ DROPS OPHTHALMIC DAILY
Qty: 2.5 ML | Refills: 11 | Status: SHIPPED | OUTPATIENT
Start: 2024-05-20

## 2024-05-20 ASSESSMENT — ENCOUNTER SYMPTOMS
ENDOCRINE NEGATIVE: 0
EYES NEGATIVE: 1
NEUROLOGICAL NEGATIVE: 0
HEMATOLOGIC/LYMPHATIC NEGATIVE: 0
GASTROINTESTINAL NEGATIVE: 0
PSYCHIATRIC NEGATIVE: 0
CARDIOVASCULAR NEGATIVE: 0
CONSTITUTIONAL NEGATIVE: 0
ALLERGIC/IMMUNOLOGIC NEGATIVE: 0
RESPIRATORY NEGATIVE: 0
MUSCULOSKELETAL NEGATIVE: 0

## 2024-05-20 ASSESSMENT — PACHYMETRY
OD_CT(UM): 543
OS_CT(UM): 539

## 2024-05-20 ASSESSMENT — VISUAL ACUITY
CORRECTION_TYPE: GLASSES
OS_CC+: -1
METHOD: SNELLEN - LINEAR
OS_CC: 20/25
OD_CC: 20/40

## 2024-05-20 ASSESSMENT — GONIOSCOPY
OD_SUPERIOR: 3/360
OS_SUPERIOR: 3/360

## 2024-05-20 ASSESSMENT — EXTERNAL EXAM - LEFT EYE: OS_EXAM: NORMAL

## 2024-05-20 ASSESSMENT — TONOMETRY
OD_IOP_MMHG: 14
OS_IOP_MMHG: 13
IOP_METHOD: GOLDMANN APPLANATION

## 2024-05-20 ASSESSMENT — SLIT LAMP EXAM - LIDS
COMMENTS: GOOD POSITION
COMMENTS: GOOD POSITION

## 2024-05-20 ASSESSMENT — CUP TO DISC RATIO
OS_RATIO: .75
OD_RATIO: .8

## 2024-05-20 ASSESSMENT — EXTERNAL EXAM - RIGHT EYE: OD_EXAM: NORMAL

## 2024-05-21 ENCOUNTER — TREATMENT (OUTPATIENT)
Dept: PHYSICAL THERAPY | Facility: CLINIC | Age: 84
End: 2024-05-21
Payer: MEDICARE

## 2024-05-21 DIAGNOSIS — M54.16 LEFT LUMBAR RADICULOPATHY: Primary | ICD-10-CM

## 2024-05-21 PROCEDURE — 97110 THERAPEUTIC EXERCISES: CPT | Mod: GP,CQ

## 2024-05-21 NOTE — PROGRESS NOTES
"Physical Therapy    Physical Therapy    Physical Therapy    Physical Therapy Treatment    Patient Name: Melita Raya  MRN: [id number]  Today's Date: 4/29/2024  Time Calculation  Start Time: 0900  Stop Time: 0950  Time Calculation (min): 50 min     PT Therapeutic Procedures Time Entry  Therapeutic Exercise Time Entry: 40        Insurance: [facility] Medicare  Authorization: [employer]  Plan of Care: 4/29/24 to 7/28/24  Visit #3    Referring MD Yoly Valente  Imaging Performed MRI showed multilevel degenerative changes in lumbar spine, severe spinal canal narrowing and mild right neural foramina narrowing.     Assessment  Attempted to progress core w/ combining med ball w/ march, which increased L hip/buttock sx's therefore held. LAD abolished sx's  Plan  Progress core strength as able.  Supine march w/ med ball overhead, 90/90 position, 90/90 tap down    Primary diagnosis: Left lumbar radiculopathy  Current Problem  1. Left lumbar radiculopathy  Follow Up In Physical Therapy              Subjective:  \"Pain is no more than a 3/10 in L hip/buttock area.  I have done the ex's .\"    Pain:  Current: 3/10, L LB         Objective   Palpation: Tender L > R SIJ        Treatment:  Therapeutic Exercise   PPT 5\" x 20   SKC 5\" x 10 R/L   LTR x 10 R/L   DKC 5\" x 5  Hip Iso's x 20  L MET for extension 10x5\"  Supine march x 10  Med ball torso pull over x 10  L LAD x 2 min  Pball pelvic mobs x 20 ea, CW/CCW   Seated lumbar flexion 5\" x 10  HP to LB EOS    Goals:  Active       PT Problem       Patient will be independent with home exercise program for home maintenance.        Start:  05/01/24    Expected End:  05/29/24            Pt will report decreased frequency of LLE symptoms to less than 25% of the day to facilitate improved standing/walking tolerance.        Start:  05/01/24    Expected End:  05/29/24            Pt will report being able to walk/stand 30 minutes without needing to sit to facilitate improved ability to do " cooking/baking tasks and walk for exercise.        Start:  05/01/24    Expected End:  06/28/24            Pt will report waking up no more than once a night a few times per week due to back discomfort to indicate improved overall symptoms and functional level.        Start:  05/01/24    Expected End:  06/28/24

## 2024-05-28 ENCOUNTER — TREATMENT (OUTPATIENT)
Dept: PHYSICAL THERAPY | Facility: CLINIC | Age: 84
End: 2024-05-28
Payer: MEDICARE

## 2024-05-28 DIAGNOSIS — M54.16 LEFT LUMBAR RADICULOPATHY: ICD-10-CM

## 2024-05-28 PROCEDURE — 97110 THERAPEUTIC EXERCISES: CPT | Mod: GP,CQ

## 2024-05-28 NOTE — PROGRESS NOTES
"Physical Therapy    Physical Therapy    Physical Therapy    Physical Therapy    Physical Therapy Treatment    Patient Name: Melita Raya  MRN: [id number]  Today's Date: 4/29/2024  Time Calculation  Start Time: 0905  Stop Time: 0950  Time Calculation (min): 45 min     PT Therapeutic Procedures Time Entry  Therapeutic Exercise Time Entry: 40        Insurance: [facility] Medicare  Authorization: [employer]  Plan of Care: 4/29/24 to 7/28/24  Visit #3    Referring MD Yoly Valente  Imaging Performed MRI showed multilevel degenerative changes in lumbar spine, severe spinal canal narrowing and mild right neural foramina narrowing.     Assessment  Continues to get L glute pain w/ ex's, that is alleviated w/ LAD. No pain EOS  Plan  Holly, mally for glute strength    Primary diagnosis: Left lumbar radiculopathy  Current Problem  1. Left lumbar radiculopathy  Follow Up In Physical Therapy              Subjective:  \"I had pain yesterday, but so far today, I am pain free.  Sx's are less frequent and less intense .\"    Pain:    Current: 0/10, L LB  Tender on palpation bilat SIJ       Objective   Palpation: Tender L > R SIJ        Treatment:  Therapeutic Exercise   PPT 5\" x 20   SKC 5\" x 10 R/L   LTR x 10   Pball DKTC x 10  Supine march w/ PPT x 20  Med ball torso pull over x 20  Hip Iso's x 20  L MET for extension 10x5\"  L LAD x 2 min  Pball pelvic mobs x 20 ea, CW/CCW   Seated lumbar flexion 5\" x 10  HP to LB EOS    Goals:  Active       PT Problem       Patient will be independent with home exercise program for home maintenance.        Start:  05/01/24    Expected End:  05/29/24            Pt will report decreased frequency of LLE symptoms to less than 25% of the day to facilitate improved standing/walking tolerance.        Start:  05/01/24    Expected End:  05/29/24            Pt will report being able to walk/stand 30 minutes without needing to sit to facilitate improved ability to do cooking/baking tasks and walk for " exercise.        Start:  05/01/24    Expected End:  06/28/24            Pt will report waking up no more than once a night a few times per week due to back discomfort to indicate improved overall symptoms and functional level.        Start:  05/01/24    Expected End:  06/28/24

## 2024-05-31 DIAGNOSIS — I10 PRIMARY HYPERTENSION: ICD-10-CM

## 2024-05-31 RX ORDER — NIFEDIPINE 30 MG/1
30 TABLET, FILM COATED, EXTENDED RELEASE ORAL DAILY
Qty: 90 TABLET | Refills: 1 | Status: SHIPPED | OUTPATIENT
Start: 2024-05-31

## 2024-05-31 RX ORDER — LOSARTAN POTASSIUM 50 MG/1
50 TABLET ORAL DAILY
Qty: 90 TABLET | Refills: 1 | Status: SHIPPED | OUTPATIENT
Start: 2024-05-31

## 2024-06-04 ENCOUNTER — TREATMENT (OUTPATIENT)
Dept: PHYSICAL THERAPY | Facility: CLINIC | Age: 84
End: 2024-06-04
Payer: MEDICARE

## 2024-06-04 DIAGNOSIS — M54.16 LEFT LUMBAR RADICULOPATHY: ICD-10-CM

## 2024-06-04 PROCEDURE — 97110 THERAPEUTIC EXERCISES: CPT | Mod: GP,CQ

## 2024-06-04 NOTE — PROGRESS NOTES
"    Physical Therapy    Physical Therapy Treatment    Patient Name: Melita Raya  MRN: [id number]  Today's Date: 4/29/2024  Time Calculation  Start Time: 0930  Stop Time: 1025  Time Calculation (min): 55 min     PT Therapeutic Procedures Time Entry  Therapeutic Exercise Time Entry: 50        Insurance: [facility] Medicare  Authorization: [employer]  Plan of Care: 4/29/24 to 7/28/24  Visit #5    Referring MD Yoly Valente  Imaging Performed MRI showed multilevel degenerative changes in lumbar spine, severe spinal canal narrowing and mild right neural foramina narrowing.     Assessment  Pt responded better to strengthening today, so will progress as able.  Discussed possible DN or cortisone injection, as lesion remains unstable.  Plan  mally Barrera for glute strength    Primary diagnosis: Left lumbar radiculopathy  Current Problem  1. Left lumbar radiculopathy  Follow Up In Physical Therapy              Subjective:  \"I was sore yesterday, I did a lot of cleaning, and had to take 3 Tylenol.  Today I am OK.\"    Pain:    Current: 0/10, L LB       Objective   Palpation: Tender L > R SIJ        Treatment:  Therapeutic Exercise  PPT 5\" x 20  SKTC x 10 ea  Pball DKTC x 10  Supine march w/ PPT x 20  L clams x 20  L LSLR x 20  L LAD x 2 min  Pball pelvic mobs x 20 ea, CW/CCW   Seated lumbar flexion 5\" x 10  HP to LB EOS    Goals:  Active       PT Problem       Patient will be independent with home exercise program for home maintenance.        Start:  05/01/24    Expected End:  05/29/24            Pt will report decreased frequency of LLE symptoms to less than 25% of the day to facilitate improved standing/walking tolerance.        Start:  05/01/24    Expected End:  05/29/24            Pt will report being able to walk/stand 30 minutes without needing to sit to facilitate improved ability to do cooking/baking tasks and walk for exercise.        Start:  05/01/24    Expected End:  06/28/24            Pt will report waking up no " more than once a night a few times per week due to back discomfort to indicate improved overall symptoms and functional level.        Start:  05/01/24    Expected End:  06/28/24

## 2024-06-11 ENCOUNTER — APPOINTMENT (OUTPATIENT)
Dept: PHYSICAL THERAPY | Facility: CLINIC | Age: 84
End: 2024-06-11
Payer: MEDICARE

## 2024-06-11 ENCOUNTER — DOCUMENTATION (OUTPATIENT)
Dept: PHYSICAL THERAPY | Facility: CLINIC | Age: 84
End: 2024-06-11
Payer: MEDICARE

## 2024-06-11 NOTE — PROGRESS NOTES
Physical Therapy                 Therapy Communication Note    Patient Name: Melita Raya  MRN: [id number]  Today's Date: 6/11/2024     Discipline: Physical Therapy    Missed Visit Reason:      Missed Time: Cancel    Comment:

## 2024-06-18 ENCOUNTER — DOCUMENTATION (OUTPATIENT)
Dept: PHYSICAL THERAPY | Facility: CLINIC | Age: 84
End: 2024-06-18
Payer: MEDICARE

## 2024-06-18 ENCOUNTER — TREATMENT (OUTPATIENT)
Dept: PHYSICAL THERAPY | Facility: CLINIC | Age: 84
End: 2024-06-18
Payer: MEDICARE

## 2024-06-18 DIAGNOSIS — M54.16 LEFT LUMBAR RADICULOPATHY: ICD-10-CM

## 2024-06-18 PROCEDURE — 97110 THERAPEUTIC EXERCISES: CPT | Mod: GP,CQ

## 2024-06-18 NOTE — PROGRESS NOTES
"Physical Therapy        Physical Therapy    Physical Therapy Treatment    Patient Name: Melita Raya  MRN: [id number]  Today's Date: 4/29/2024  Time Calculation  Start Time: 0910  Stop Time: 0955  Time Calculation (min): 45 min     PT Therapeutic Procedures Time Entry  Therapeutic Exercise Time Entry: 35        Insurance: [facility] Medicare  Authorization: [employer]  Plan of Care: 4/29/24 to 7/28/24  Visit #6    Referring MD Yoly Valente  Imaging Performed MRI showed multilevel degenerative changes in lumbar spine, severe spinal canal narrowing and mild right neural foramina narrowing.     Assessment  L SIJ remains tender to touch, LAD, fig 4 stretch helped decreasing pain.  Plan  Dead Bugs, S/L ITB stretching?    Primary diagnosis: Left lumbar radiculopathy  Current Problem  1. Left lumbar radiculopathy  Follow Up In Physical Therapy              Subjective:  \"I don't understand why I am feeling better one day, then the pain comes back after I do something like cleaning.\"    Pain:    Current: 0/10, L LB       Objective   Palpation: Tender L > R SIJ        Treatment:  Therapeutic Exercise  PPT 5\" x 20  SKTC x 10 ea  Pball DKTC x 10  Supine march w/ PPT x 20  L clams x 20  L LSLR x 20  L MET for ext 10x5\"  L LAD x 2 min  L Fig 4 ITB stretch 10x10\"  Pball pelvic mobs x 20 ea, CW/CCW   Seated lumbar flexion 5\" x 10  HP to LB EOS    Goals:  Active       PT Problem       Patient will be independent with home exercise program for home maintenance.        Start:  05/01/24    Expected End:  05/29/24            Pt will report decreased frequency of LLE symptoms to less than 25% of the day to facilitate improved standing/walking tolerance.        Start:  05/01/24    Expected End:  05/29/24            Pt will report being able to walk/stand 30 minutes without needing to sit to facilitate improved ability to do cooking/baking tasks and walk for exercise.        Start:  05/01/24    Expected End:  06/28/24            Pt will " report waking up no more than once a night a few times per week due to back discomfort to indicate improved overall symptoms and functional level.        Start:  05/01/24    Expected End:  06/28/24                            negative

## 2024-06-18 NOTE — PROGRESS NOTES
Physical Therapy                 Therapy Communication Note    Patient Name: Melita Raya  MRN: [id number]  Today's Date: 6/18/2024     Discipline: Physical Therapy    Missed Visit Reason:      Missed Time: No Show    Comment:

## 2024-06-25 ENCOUNTER — TREATMENT (OUTPATIENT)
Dept: PHYSICAL THERAPY | Facility: CLINIC | Age: 84
End: 2024-06-25
Payer: MEDICARE

## 2024-06-25 DIAGNOSIS — M54.16 LEFT LUMBAR RADICULOPATHY: ICD-10-CM

## 2024-06-25 PROCEDURE — 97140 MANUAL THERAPY 1/> REGIONS: CPT | Mod: GP,CQ

## 2024-06-25 PROCEDURE — 97110 THERAPEUTIC EXERCISES: CPT | Mod: GP,CQ

## 2024-06-25 NOTE — PROGRESS NOTES
"Physical Therapy    Physical Therapy        Physical Therapy    Physical Therapy Treatment    Patient Name: Melita Raya  MRN: [id number]  Today's Date: 4/29/2024  Time Calculation  Start Time: 0840  Stop Time: 0940  Time Calculation (min): 60 min     PT Therapeutic Procedures Time Entry  Manual Therapy Time Entry: 10  Therapeutic Exercise Time Entry: 35        Insurance: [facility] Medicare  Authorization: [employer]  Plan of Care: 4/29/24 to 7/28/24  Visit #7    Referring MD Yoly Valente  Imaging Performed MRI showed multilevel degenerative changes in lumbar spine, severe spinal canal narrowing and mild right neural foramina narrowing.     Assessment  Despite pt reporting 85% improvement, lesion remains unstable.  Discussed continued compliance to HEP, possible cortisone injection  Plan  SLR w/ PPT    Primary diagnosis: Left lumbar radiculopathy  Current Problem  1. Left lumbar radiculopathy  Follow Up In Physical Therapy              Subjective:  \"Overall, I feel like I am about 85% of the way there.\"    Pain:    Current: 3/10, L LB       Objective   Palpation: Tender L > R SIJ        Treatment:  Therapeutic Exercise  L LAD x 5 min  Belt/ball hip iso's x 20 ea, 5\"  PPT x 20  DKTC on Pball x 20  Fig 4 ITB stretch 10x10\", L  Curl ups x 10  Supine march w/ PPT x 20, not today 2` rad sx's  MET L ext x 10, 5\", not today 2` rad sx's  L LAD x 5 min  Lat amb x 3, YTB  Nu Step x 5 min  Pball pelvic mobs x 20 ea, CW/CCW  Seated lumbar flexion stretch 10x5\"  HP to LB EOS    Goals:  Active       PT Problem       Patient will be independent with home exercise program for home maintenance.        Start:  05/01/24    Expected End:  05/29/24            Pt will report decreased frequency of LLE symptoms to less than 25% of the day to facilitate improved standing/walking tolerance.        Start:  05/01/24    Expected End:  05/29/24            Pt will report being able to walk/stand 30 minutes without needing to sit to facilitate " improved ability to do cooking/baking tasks and walk for exercise.        Start:  05/01/24    Expected End:  06/28/24            Pt will report waking up no more than once a night a few times per week due to back discomfort to indicate improved overall symptoms and functional level.        Start:  05/01/24    Expected End:  06/28/24

## 2024-07-08 ENCOUNTER — TREATMENT (OUTPATIENT)
Dept: PHYSICAL THERAPY | Facility: CLINIC | Age: 84
End: 2024-07-08
Payer: MEDICARE

## 2024-07-08 DIAGNOSIS — M54.16 LEFT LUMBAR RADICULOPATHY: Primary | ICD-10-CM

## 2024-07-08 DIAGNOSIS — H40.1132 PRIMARY OPEN ANGLE GLAUCOMA (POAG) OF BOTH EYES, MODERATE STAGE: Primary | ICD-10-CM

## 2024-07-08 PROCEDURE — 97110 THERAPEUTIC EXERCISES: CPT | Mod: GP,CQ

## 2024-07-08 RX ORDER — LATANOPROST 50 UG/ML
1 SOLUTION/ DROPS OPHTHALMIC NIGHTLY
Qty: 2.5 ML | Refills: 11 | Status: SHIPPED | OUTPATIENT
Start: 2024-07-08

## 2024-07-08 NOTE — PROGRESS NOTES
"Physical Therapy    Physical Therapy        Physical Therapy    Physical Therapy Treatment    Patient Name: Meltia Raya  MRN: [id number]  Today's Date: 4/29/2024  Time Calculation  Start Time: 1100  Stop Time: 1145  Time Calculation (min): 45 min     PT Therapeutic Procedures Time Entry  Therapeutic Exercise Time Entry: 40        Insurance: [facility] Medicare  Authorization: [employer]  Plan of Care: 4/29/24 to 7/28/24  Visit #8    Referring MD Yoly Valente  Imaging Performed MRI showed multilevel degenerative changes in lumbar spine, severe spinal canal narrowing and mild right neural foramina narrowing.     Assessment  Re-issued HEP. Discussed how to proceed w/ ex's and importance of staying pain free throughout rx.  Plan  SLR w/ PPT    Primary diagnosis: Left lumbar radiculopathy  Current Problem    Problem List Items Addressed This Visit    None  Visit Diagnoses         Codes    Left lumbar radiculopathy    -  Primary M54.16                   Subjective:  \"No pain now, and generally, I feel good when I leave here, but after about 3 days, the pain comes back in the L SIJ area.  I have not heard back regarding an injection.\"    Pain:    Current: 0/10, L LB       Objective   Palpation: Tender L > R SIJ        Treatment:  Therapeutic Exercise  PPT x 20  Curl up x 20  Belt/ball hip iso's x 20 ea, 5\"  L MET ext x 5, pain  L LAD x 3 min  BKFO x 10  DKTC on Pball x 20  PPT x 10 for pain modification  Fig 4 ITB stretch 10x10\", L  Supine march w/ PPT x 10  Pball pelvic mobs x 20 ea, CW/CCW  Seated lumbar flexion stretch 10x5\"  HP to LB EOS    Goals:  Active       PT Problem       Patient will be independent with home exercise program for home maintenance.        Start:  05/01/24    Expected End:  05/29/24            Pt will report decreased frequency of LLE symptoms to less than 25% of the day to facilitate improved standing/walking tolerance.        Start:  05/01/24    Expected End:  05/29/24            Pt will report " being able to walk/stand 30 minutes without needing to sit to facilitate improved ability to do cooking/baking tasks and walk for exercise.        Start:  05/01/24    Expected End:  06/28/24            Pt will report waking up no more than once a night a few times per week due to back discomfort to indicate improved overall symptoms and functional level.        Start:  05/01/24    Expected End:  06/28/24

## 2024-07-11 ENCOUNTER — APPOINTMENT (OUTPATIENT)
Dept: CARDIOLOGY | Facility: CLINIC | Age: 84
End: 2024-07-11
Payer: MEDICARE

## 2024-07-11 VITALS
DIASTOLIC BLOOD PRESSURE: 55 MMHG | OXYGEN SATURATION: 97 % | HEART RATE: 45 BPM | SYSTOLIC BLOOD PRESSURE: 166 MMHG | WEIGHT: 172.5 LBS | HEIGHT: 65 IN | BODY MASS INDEX: 28.74 KG/M2

## 2024-07-11 DIAGNOSIS — I25.10 ARTERIOSCLEROTIC CORONARY ARTERY DISEASE: Primary | ICD-10-CM

## 2024-07-11 DIAGNOSIS — Z01.810 PREOP CARDIOVASCULAR EXAM: ICD-10-CM

## 2024-07-11 DIAGNOSIS — R93.1 AGATSTON CORONARY ARTERY CALCIUM SCORE BETWEEN 200 AND 399: ICD-10-CM

## 2024-07-11 DIAGNOSIS — I10 PRIMARY HYPERTENSION: ICD-10-CM

## 2024-07-11 DIAGNOSIS — E78.2 MIXED HYPERLIPIDEMIA: ICD-10-CM

## 2024-07-11 PROCEDURE — 1036F TOBACCO NON-USER: CPT | Performed by: INTERNAL MEDICINE

## 2024-07-11 PROCEDURE — 1157F ADVNC CARE PLAN IN RCRD: CPT | Performed by: INTERNAL MEDICINE

## 2024-07-11 PROCEDURE — 99214 OFFICE O/P EST MOD 30 MIN: CPT | Performed by: INTERNAL MEDICINE

## 2024-07-11 PROCEDURE — 3078F DIAST BP <80 MM HG: CPT | Performed by: INTERNAL MEDICINE

## 2024-07-11 PROCEDURE — 1126F AMNT PAIN NOTED NONE PRSNT: CPT | Performed by: INTERNAL MEDICINE

## 2024-07-11 PROCEDURE — 1159F MED LIST DOCD IN RCRD: CPT | Performed by: INTERNAL MEDICINE

## 2024-07-11 PROCEDURE — 3077F SYST BP >= 140 MM HG: CPT | Performed by: INTERNAL MEDICINE

## 2024-07-11 PROCEDURE — 1160F RVW MEDS BY RX/DR IN RCRD: CPT | Performed by: INTERNAL MEDICINE

## 2024-07-11 ASSESSMENT — PATIENT HEALTH QUESTIONNAIRE - PHQ9
SUM OF ALL RESPONSES TO PHQ9 QUESTIONS 1 AND 2: 0
2. FEELING DOWN, DEPRESSED OR HOPELESS: NOT AT ALL
1. LITTLE INTEREST OR PLEASURE IN DOING THINGS: NOT AT ALL

## 2024-07-11 ASSESSMENT — ENCOUNTER SYMPTOMS
OCCASIONAL FEELINGS OF UNSTEADINESS: 1
LOSS OF SENSATION IN FEET: 0
DEPRESSION: 0

## 2024-07-11 ASSESSMENT — PAIN SCALES - GENERAL: PAINLEVEL: 0-NO PAIN

## 2024-07-11 NOTE — PATIENT INSTRUCTIONS
You were seen in the [facility] for your coronary arteriosclerosis and high cholesterol.     Your CT calcium score in 2018 showed a level of 381 which is moderate and shows you have some hardening of the heart arteries. To protect you from having a heart attack we are doin. Aspirin 81 mg a day for life  2. Atorvastatin 80 mg a day. This is strong cholesterol will help protect you from having a heart attack and remove cholesterol plaque from your heart arteries. Your 2024 cholesterol blood work shows that your LDL decreased to 107 from 122 in 2023 near our long term LDL goal for you of < 100.   3. Ezetimibe 10 mg a day for your cholesterol.  4. Continue nifedipine ER 30 mg a day for blood pressure.   5. Continue hydrochlorothiazide 25 mg a day for blood pressure  6. Continue atenolol to 25 mg a day for blood pressure.  7. Continue losartan 50 mg a day for blood pressure.    Your echocardiogram from 2018 showed no major abnormalities. Your aortic valve has some mild calcium build up on it but is unchanged from 2014. This is unlikely to ever be a problem. Your heart murmur is soft. I do not recommend repeat imaging at this time.     Continue to eat a heart healthy diet with low saturated fat and cholesterol. Walk 30 minutes 3 times a week to protect your heart. Your lifestyle program changes in  resulted in well controlled blood pressure and cholesterol and 15 pounds of weight loss.    You are a low-intermediate risk patient from a heart standpoint. You are well protected on your current blood pressure and cholesterol medications. You may hold aspirin for 7 days before the operation. I do not recommend any further heart testing before you are scheduled for surgery.     Follow up with Dr. Hickman in 6 months.

## 2024-07-11 NOTE — PROGRESS NOTES
Subjective   Melita Raya is a 83 y.o. female who presents to the [facility] for follow up of dyslipidemia, hypertension and aortic valve sclerosis. Last seen in 2024.     Since last visit, Ms. Raya has stable cardiac status with CHF symptoms (PND, orthopnea), chest pain, palpitations, claudication, or syncope. She notes less exertional dyspnea and back pain after physical therapy and lifestyle modification program changes to diet and aerobic exercise with 15 lb weight loss in . Now home BP trend 120s/70s mm Hg on review of home log book.     She has lower back pain that did not resolve with physical therapy program. Is being evaluated for L4-L5 spinal surgery.     2018 CT calcium score of 381. ROUSE 10 year risk score of 21% for MI based on risk factors at that time. We increased atorvastatin to 80 mg a day in 2019. Blood pressure at goal on current medications. Her ankle edema has resolved since starting HCTZ 25 mg a day.  has stable exertional dyspnea with climbing stairs. Has done less walking since February due to bereavement from her daughter's death.      Normal LV function on echo 2018 with stable mild aortic sclerosis without stenosis.     Past Medical History:  1. Coronary arteriosclerosis: 2018 CT calcium score of 381. ROUSE 10 year risk score of 21%  2. Hypertension  3. Dyslipidemia: 2019 lipids (atorvastatin 80 mg):  / TG 84 / HDL 71 / . Prior use of ezetimibe did not lower LDL level. Prior use of simvastatin 40 mg and 80 mg caused increase in LFTs.   4. Osteoarthritis     Past Surgical History:  1. Right knee replacement      Social History:  Quit tobacco 40 years ago. Retired from  at [employer].     Family History:  Mother and 2 sisters with CAD prior to 66 yo. Mother  at 63 yo of MI. Sister had stent 7 years ago.     Review of Systems    A 14 point review of systems was  asked. All questions were negative except for pertinent positives listed in the HPI.     Current Outpatient Medications on File Prior to Visit   Medication Sig Dispense Refill    aspirin 81 mg EC tablet Take 1 tablet (81 mg) by mouth once daily.      atenolol (Tenormin) 25 mg tablet Take 1 tablet (25 mg) by mouth once daily. 90 tablet 3    atorvastatin (Lipitor) 80 mg tablet Take 1 tablet (80 mg) by mouth once daily. 90 tablet 3    cetirizine (ZyrTEC) 10 mg tablet Take 1 tablet (10 mg) by mouth once daily in the morning. 90 tablet 3    cholecalciferol (Vitamin D-3) 50 MCG (2000 UT) tablet Take 1 tablet (50 mcg) by mouth once daily.      ezetimibe (Zetia) 10 mg tablet Take 1 tablet (10 mg) by mouth once daily.      fluticasone (Flonase) 50 mcg/actuation nasal spray Administer 1 spray into each nostril once daily. 16 g 3    hydroCHLOROthiazide (HYDRODiuril) 25 mg tablet Take 1 tablet (25 mg) by mouth once daily. 90 tablet 3    latanoprost (Xalatan) 0.005 % ophthalmic solution Administer 1 drop into both eyes once daily at bedtime. 2.5 mL 11    losartan (Cozaar) 50 mg tablet Take 1 tablet (50 mg) by mouth once daily. 90 tablet 1    NIFEdipine ER (Adalat CC) 30 mg 24 hr tablet Take 1 tablet (30 mg) by mouth once daily. 90 tablet 1    timolol (Timoptic) 0.5 % ophthalmic solution Administer 1 drop into affected eye(s) once daily. Instill one gtt o.u. qam 2.5 mL 11    [DISCONTINUED] latanoprost (Xalatan) 0.005 % ophthalmic solution Administer 1 drop into both eyes once daily at bedtime. 2.5 mL 11     No current facility-administered medications on file prior to visit.      Objective   Physical Exam  BP Readings from Last 3 Encounters:   07/11/24 166/55   05/07/24 122/73   03/29/24 153/75      Wt Readings from Last 3 Encounters:   07/11/24 78.2 kg (172 lb 8 oz)   05/07/24 78 kg (172 lb)   04/12/24 83 kg (183 lb)      BMI: Estimated body mass index is 28.71 kg/m² as calculated from the following:    Height as of this  "encounter: 1.651 m (5' 5\").    Weight as of this encounter: 78.2 kg (172 lb 8 oz).  BSA: Estimated body surface area is 1.89 meters squared as calculated from the following:    Height as of this encounter: 1.651 m (5' 5\").    Weight as of this encounter: 78.2 kg (172 lb 8 oz).    General: no acute distress  HEENT: EOMI, no scleral icterus.  Lungs: Clear to auscultation bilaterally without wheezing, rales, or rhonchi.  Cardiovascular: Regular rhythm and rate. Normal S1 and S2. No murmurs, rubs, or gallops are appreciated. JVP normal.  Abdomen: Soft, nontender, nondistended. Bowel sounds present.  Extremities: Warm and well perfused with equal 2+ pulses bilaterally.  No edema present.  Neurologic: Alert and oriented x3.    I have personally reviewed the following images and laboratory findings:  Last echocardiogram:   Most recent echo, 1/10/2018: LV EF 60-65%, impaired relaxation diastology (E/e' 15), normal LA size (MARTIN 21), normal RV/RA, aortic valve sclerosis, trivial AI, mild MAC, trace MR, trace TR. RVSP not estimated (RA pressure ~ 3 mm Hg with normal IVC size and respiratory collapse). No changes from 2014    Last cath / stress test / CACS:   2018 CT calcium score of 381. ROUSE 10 year risk score of 21%    Most recent EC2024 ECG: Sinus rhythm, 50 bpm, normal ECG. Personally reviewed in office.    Lab Results   Component Value Date    CHOL 198 2024    CHOL 226 (H) 2023    CHOL 192 2022     Lab Results   Component Value Date    HDL 73.4 2024    HDL 88.0 2023    HDL 58.7 2022     Lab Results   Component Value Date    LDLCALC 107 (H) 2024     Lab Results   Component Value Date    TRIG 88 2024    TRIG 78 2023    TRIG 157 (H) 2022     No components found for: \"CHOLHDL\"      Assessment/Plan   1. Hypertension:  Blood pressure ambulatory range 120-130s mm Hg. Edema resolved with starting HCTZ 25 mg daily. Continue nifedipine ER 30 mg, atenolol " 25 mg a day. HR increased to 54 bpm from 44 bpm with reduction of atenolol to 25 mg at end of 2019. HR trend 50-71 bpm in last 6 months of 2022 on review of EMR vital signs.     2. Exertional dyspnea:  Has improved with blood pressure control and adding HCTZ 25 mg for mild diuresis. Likely due to hypertensive heart disease. Walking program helping with shortness of breath symptom management. Will observe.     3. Coronary arteriosclerosis / Dyslipidemia:  CT calcium score was 381 in 12/2018 with ROUSE 10 year risk score for MI of 21%. Continue atorvastatin 80 mg a day high intensity statin therapy. Repeat January 2024 LDL decreased to 107 from 2023 . Continue atorvastatin 80 mg a day and ezetimibe 10 mg a day. Goal LDL < 100.     Lifestyle modification of low cholesterol diet and aerobic exercise (walking program) successful in 2022. Counseled to continue physical activity of 30 minutes 3 times a week for improved cardiopulmonary fitness.     4. Preoperative evaluation:  Low-moderate risk patient from heart standpoint but medically optimized for lumbar spinal surgery on current medications. Can hold aspirin 81 mg a day for 7 days before operation. Continue hypertension and cholesterol medications in perioperative period.     Follow up with Dr. Hickman 6 months.            SIGNATURE: Negro Hickman MD PATIENT NAME: Melita Raya   DATE/TIME: July 11, 2024 1:37 PM MRN: [id number]

## 2024-07-12 ENCOUNTER — TELEPHONE (OUTPATIENT)
Dept: CARDIOLOGY | Facility: HOSPITAL | Age: 84
End: 2024-07-12

## 2024-07-12 DIAGNOSIS — E78.2 MIXED HYPERLIPIDEMIA: Primary | ICD-10-CM

## 2024-07-15 ENCOUNTER — TELEPHONE (OUTPATIENT)
Dept: NEUROSURGERY | Facility: HOSPITAL | Age: 84
End: 2024-07-15
Payer: MEDICARE

## 2024-07-15 DIAGNOSIS — M48.062 LUMBAR STENOSIS WITH NEUROGENIC CLAUDICATION: Primary | ICD-10-CM

## 2024-07-15 RX ORDER — EZETIMIBE 10 MG/1
10 TABLET ORAL DAILY
Qty: 90 TABLET | Refills: 3 | Status: SHIPPED | OUTPATIENT
Start: 2024-07-15

## 2024-07-15 NOTE — TELEPHONE ENCOUNTER
Ms Raya called our office mentioning that she continues to be symptomatic with her symptoms of neurogenic claudication with difficulty walking and standing for any significant period of time despite the physical therapy that she has been trying recently where she has completed almost 2 months of physical therapy and also over the past 1 year or so.  At this point of time she wanted to discuss surgical options.    I called her on her cell phone today and went over her symptoms and she endorsed being significantly symptomatic despite physical therapy and mentions that her symptoms are overall at this point of time been present for almost 3 years or so and has been getting worse.  She feels that she is limited in her activity of daily living and would want to get back if there are any surgical options available.    I have reviewed imaging and diagnosis with the patient, discussed the natural history of the disease and both non-operative and operative treatments available and rationale vs risks for both.   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    The patient's clinical symptoms correlates well with the radiological findings.    She has been having significant functional impairment with decreased ability to perform her ADL secondary to pain and/or weakness secondary to signs and symptoms of neurogenic claudication    She has tried various conservative treatment options that included use of PAIN MEDICATIONS and formal PHYSICIAN DIRECTED PHYSICAL THERAPY (PT) program.    Considering the degree of pain and disability secondary to nerve root compression from stenosis, surgery at this point is indicated and is medically necessary to improve the quality of life and day to day functioning.  If left untreated she is at risk of further progression of her symptoms and limitation of activity of daily.      I offered her the option of surgery that would consist of minimally  invasive outpatient L4-5 lumbar laminectomy with medial facetectomy.    I have explained the surgical procedure in detail with expected duration and extent of recovery along risks of surgery that include, but is not limited to bleeding, infection, blood vessel injury or damage,loss of sensation, loss of bladder, bowel or sexual function, nerve injury/damage resulting in weakness/paralysis,  cerebrospinal fluid (CSF) leak, failure to relieve symptoms, recurrent symptoms with need of further surgery with instrumentation especially if the patient develops instability or spondylolisthesis at the index level following decompression, adjacent segment disease, need to reoperate for any reason and general anesthesia reaction such as stroke, coma, heart attack, delirium, confusion, death as well as worsening of preexisted medical conditions and any other unforeseen complication related to unrelated to the spinal procedure per se.    A Shared decision was made to proceed with surgery after involving the patient in the treatment decision-making process.  The patient clearly expressed understanding of possible risks and benefits of surgery and the realistic expectations of surgery along with the fact that the goal of the surgery is to improve the  overall functioning and quality of life by improvement of the current level of function,  possible improvement and/or prevent progression of preexisting neurological deficits and not necessarily 100 % pain relief. There is no guarantee that the prexisiting deficits will improve definitely after surgery. Also discussed with the patient a small but possible chances of worsening of her her symptoms or development of new symptoms despite a successful surgery that may be worse than what she has now. The option of continued non-operative management was very clearly discussed as well with its associated risks and benefits and the patient was clearly provided that option.     She is on  aspirin for her cardiac condition and she can continue aspirin throughout the perioperative period.    It was a pleasure to participate in Ms. Raya clinical care. All questions were answered to her satisfaction and she explained understanding of the further treatment plan.     Vishal Estrella MD, [facility]   of Neurological Surgery  [employer]  Attending Surgeon  Director - Minimally Invasive Spine Surgery  [place], [place]      ---Some of this note was completed using Dragon voice recognition technology and sometimes the software misinterprets words. This may include unintended errors with respect to translation of words, typographical errors or grammar errors which may not have been identified prior to finalization of the chart note. Please take this into account when reading this note---

## 2024-07-17 ENCOUNTER — APPOINTMENT (OUTPATIENT)
Dept: PHYSICAL THERAPY | Facility: CLINIC | Age: 84
End: 2024-07-17
Payer: MEDICARE

## 2024-07-17 PROBLEM — M48.062 LUMBAR STENOSIS WITH NEUROGENIC CLAUDICATION: Status: ACTIVE | Noted: 2024-07-15

## 2024-07-19 ENCOUNTER — CLINICAL SUPPORT (OUTPATIENT)
Dept: PREADMISSION TESTING | Facility: HOSPITAL | Age: 84
End: 2024-07-19
Payer: MEDICARE

## 2024-07-19 DIAGNOSIS — M48.062 LUMBAR STENOSIS WITH NEUROGENIC CLAUDICATION: ICD-10-CM

## 2024-07-19 RX ORDER — DEXTROMETHORPHAN HYDROBROMIDE, GUAIFENESIN 5; 100 MG/5ML; MG/5ML
650 LIQUID ORAL EVERY 8 HOURS PRN
COMMUNITY

## 2024-07-22 ENCOUNTER — LAB (OUTPATIENT)
Dept: LAB | Facility: LAB | Age: 84
End: 2024-07-22
Payer: MEDICARE

## 2024-07-22 ENCOUNTER — PRE-ADMISSION TESTING (OUTPATIENT)
Dept: PREADMISSION TESTING | Facility: HOSPITAL | Age: 84
End: 2024-07-22
Payer: MEDICARE

## 2024-07-22 VITALS
OXYGEN SATURATION: 98 % | WEIGHT: 172.4 LBS | DIASTOLIC BLOOD PRESSURE: 54 MMHG | TEMPERATURE: 95.9 F | SYSTOLIC BLOOD PRESSURE: 149 MMHG | HEART RATE: 55 BPM | BODY MASS INDEX: 28.72 KG/M2 | HEIGHT: 65 IN | RESPIRATION RATE: 16 BRPM

## 2024-07-22 DIAGNOSIS — Z01.818 PREOP TESTING: Primary | ICD-10-CM

## 2024-07-22 DIAGNOSIS — R73.9 ELEVATED BLOOD SUGAR: ICD-10-CM

## 2024-07-22 DIAGNOSIS — Z01.818 PREOP TESTING: ICD-10-CM

## 2024-07-22 LAB
ABO GROUP (TYPE) IN BLOOD: NORMAL
ANION GAP SERPL CALC-SCNC: 16 MMOL/L (ref 10–20)
ANTIBODY SCREEN: NORMAL
APPEARANCE UR: CLEAR
BASOPHILS # BLD AUTO: 0.06 X10*3/UL (ref 0–0.1)
BASOPHILS NFR BLD AUTO: 0.9 %
BILIRUB UR STRIP.AUTO-MCNC: NEGATIVE MG/DL
BUN SERPL-MCNC: 24 MG/DL (ref 6–23)
CALCIUM SERPL-MCNC: 9.7 MG/DL (ref 8.6–10.3)
CHLORIDE SERPL-SCNC: 100 MMOL/L (ref 98–107)
CO2 SERPL-SCNC: 22 MMOL/L (ref 21–32)
COLOR UR: ABNORMAL
CREAT SERPL-MCNC: 1.09 MG/DL (ref 0.5–1.05)
EGFRCR SERPLBLD CKD-EPI 2021: 51 ML/MIN/1.73M*2
EOSINOPHIL # BLD AUTO: 0.34 X10*3/UL (ref 0–0.4)
EOSINOPHIL NFR BLD AUTO: 4.9 %
ERYTHROCYTE [DISTWIDTH] IN BLOOD BY AUTOMATED COUNT: 11.9 % (ref 11.5–14.5)
EST. AVERAGE GLUCOSE BLD GHB EST-MCNC: 108 MG/DL
GLUCOSE SERPL-MCNC: 99 MG/DL (ref 74–99)
GLUCOSE UR STRIP.AUTO-MCNC: NORMAL MG/DL
HBA1C MFR BLD: 5.4 %
HCT VFR BLD AUTO: 37.3 % (ref 36–46)
HGB BLD-MCNC: 12.4 G/DL (ref 12–16)
IMM GRANULOCYTES # BLD AUTO: 0.01 X10*3/UL (ref 0–0.5)
IMM GRANULOCYTES NFR BLD AUTO: 0.1 % (ref 0–0.9)
KETONES UR STRIP.AUTO-MCNC: NEGATIVE MG/DL
LEUKOCYTE ESTERASE UR QL STRIP.AUTO: ABNORMAL
LYMPHOCYTES # BLD AUTO: 3.48 X10*3/UL (ref 0.8–3)
LYMPHOCYTES NFR BLD AUTO: 50.4 %
MCH RBC QN AUTO: 30.1 PG (ref 26–34)
MCHC RBC AUTO-ENTMCNC: 33.2 G/DL (ref 32–36)
MCV RBC AUTO: 91 FL (ref 80–100)
MONOCYTES # BLD AUTO: 0.57 X10*3/UL (ref 0.05–0.8)
MONOCYTES NFR BLD AUTO: 8.3 %
MUCOUS THREADS #/AREA URNS AUTO: ABNORMAL /LPF
NEUTROPHILS # BLD AUTO: 2.44 X10*3/UL (ref 1.6–5.5)
NEUTROPHILS NFR BLD AUTO: 35.4 %
NITRITE UR QL STRIP.AUTO: NEGATIVE
NRBC BLD-RTO: 0 /100 WBCS (ref 0–0)
PH UR STRIP.AUTO: 6.5 [PH]
PLATELET # BLD AUTO: 251 X10*3/UL (ref 150–450)
POTASSIUM SERPL-SCNC: 4.1 MMOL/L (ref 3.5–5.3)
PROT UR STRIP.AUTO-MCNC: NEGATIVE MG/DL
RBC # BLD AUTO: 4.12 X10*6/UL (ref 4–5.2)
RBC # UR STRIP.AUTO: NEGATIVE /UL
RBC #/AREA URNS AUTO: ABNORMAL /HPF
RH FACTOR (ANTIGEN D): NORMAL
SODIUM SERPL-SCNC: 134 MMOL/L (ref 136–145)
SP GR UR STRIP.AUTO: 1.02
SQUAMOUS #/AREA URNS AUTO: ABNORMAL /HPF
UROBILINOGEN UR STRIP.AUTO-MCNC: NORMAL MG/DL
WBC # BLD AUTO: 6.9 X10*3/UL (ref 4.4–11.3)
WBC #/AREA URNS AUTO: ABNORMAL /HPF

## 2024-07-22 PROCEDURE — 81001 URINALYSIS AUTO W/SCOPE: CPT

## 2024-07-22 PROCEDURE — 36415 COLL VENOUS BLD VENIPUNCTURE: CPT

## 2024-07-22 PROCEDURE — 86901 BLOOD TYPING SEROLOGIC RH(D): CPT

## 2024-07-22 PROCEDURE — 86900 BLOOD TYPING SEROLOGIC ABO: CPT

## 2024-07-22 PROCEDURE — 80048 BASIC METABOLIC PNL TOTAL CA: CPT

## 2024-07-22 PROCEDURE — 86850 RBC ANTIBODY SCREEN: CPT

## 2024-07-22 PROCEDURE — 87081 CULTURE SCREEN ONLY: CPT | Mod: AHULAB | Performed by: PHYSICIAN ASSISTANT

## 2024-07-22 PROCEDURE — 85025 COMPLETE CBC W/AUTO DIFF WBC: CPT

## 2024-07-22 RX ORDER — CHLORHEXIDINE GLUCONATE ORAL RINSE 1.2 MG/ML
SOLUTION DENTAL
Qty: 475 ML | Refills: 0 | Status: SHIPPED | OUTPATIENT
Start: 2024-07-22

## 2024-07-22 ASSESSMENT — ENCOUNTER SYMPTOMS
PALPITATIONS: 0
EYE DISCHARGE: 0
VOMITING: 0
BRUISES/BLEEDS EASILY: 0
TROUBLE SWALLOWING: 0
EYE PAIN: 0
DOUBLE VISION: 0
BLOOD IN STOOL: 0
WOUND: 0
VISUAL CHANGE: 0
LIMITED RANGE OF MOTION: 0
COUGH: 0
SKIN CHANGES: 0
UNEXPECTED WEIGHT CHANGE: 0
DYSPNEA AT REST: 0
WHEEZING: 0
WEAKNESS: 0
LIGHT-HEADEDNESS: 0
HEMOPTYSIS: 0
DYSPNEA WITH EXERTION: 0
MYALGIAS: 1
ABDOMINAL DISTENTION: 0
ARTHRALGIAS: 1
RHINORRHEA: 0
SHORTNESS OF BREATH: 0
CONSTIPATION: 0
NUMBNESS: 0
FEVER: 0
NAUSEA: 0
DYSURIA: 0
SINUS CONGESTION: 0
CHILLS: 0
CONFUSION: 0
NECK STIFFNESS: 0
EXCESSIVE BLEEDING: 0
ABDOMINAL PAIN: 0
DIARRHEA: 0
NECK PAIN: 0
DIFFICULTY URINATING: 0

## 2024-07-22 NOTE — CPM/PAT H&P
Anesthesia Pre-Procedure Evaluation    Patient: Shazia Verma   MRN: [id number] : 1970        Preoperative Diagnosis: Iron deficiency [E61.1]    Procedure : Procedure(s):  upper endoscopy and colonoscopy          Past Medical History:   Diagnosis Date     Breast mass     benign right     Diabetes (H)      Hypertension       Past Surgical History:   Procedure Laterality Date     APPENDECTOMY  1991     BIOPSY BREAST Right     benign     BIOPSY BREAST Right 2020    US Bx benign     BREAST BIOPSY, RT/LT Right 2002    right bx      SECTION  1992      COLONOSCOPY  2013     GASTRIC BYPASS       gatric bypass       LAPAROSCOPIC HYSTERECTOMY SUPRACERVICAL N/A 2018    Procedure: LAPAROSCOPIC HYSTERECTOMY SUPRACERVICAL;  LAPAROSCOPIC SUPRACERVICAL HYSTERECTOMY, BILATEARL SALPINGECTOMY, LYSIS OF ADHESIONS;  Surgeon: Jean Marie Garvin MD;  Location: [facility]     ORTHOPEDIC SURGERY      right hip labreal tear     ORTHOPEDIC SURGERY      left wrist      SALPINGECTOMY Bilateral 2018    Procedure: SALPINGECTOMY;;  Surgeon: Jean Marie Garvin MD;  Location: [facility]      Allergies   Allergen Reactions     Reglan [Metoclopramide] Anxiety      Social History     Tobacco Use     Smoking status: Former Smoker     Types: Cigarettes     Start date: 1986     Quit date:      Years since quittin.1     Smokeless tobacco: Never Used   Substance Use Topics     Alcohol use: Yes     Comment: occ      Wt Readings from Last 1 Encounters:   22 86.2 kg (190 lb)        Anesthesia Evaluation   Pt has had prior anesthetic. Type: General and MAC.    No history of anesthetic complications       ROS/MED HX  ENT/Pulmonary:     (+) tobacco use, Past use,     Neurologic:     (+) migraines,     Cardiovascular:     (+) hypertension-----    METS/Exercise Tolerance: >4 METS    Hematologic: Comments: Iron deficiency      Musculoskeletal:  - neg musculoskeletal ROS     GI/Hepatic: Comment:  [facility] Evaluation       Name: Melita Raya (Melita Raya)  /Age: 1940/83 y.o.       Date of Consult: 24     Referring Provider: Dr. Estrella    Surgery, Date, and Length: L4-L5 Minimally Invasive Lumbar Laminectomy;Medial Facetectomy , 24, 150MIN    Melita Raya is a 83 year-old female who presents to the [facility] for perioperative risk assessment prior to surgery.    Patient presents with a primary diagnosis of lumbar spinal stenosis. LOW BACK PAIN radiation to LEFT LEG  that started about  3  year  ago, and have been gradually worsening since that time.  The symptoms started after no known injury     The pain is 8 /10. The pain is described as burning, soreness, stiffness, and tingling and occurs intermittently.  Symptoms are exacerbated by standing. Factors which relieve the pain include acetaminophen, change in body position, and exercise. She has tried PT which has only helped marginally with her pain.  Pt wishes to proceed with surgery as planned.     This note was created in part upon personal review of patient's medical records.      Patient is scheduled to have L4-L5 Minimally Invasive Lumbar Laminectomy;Medial Facetectomy      Pt denies any past history of anesthetic complications such as PONV, awareness, prolonged sedation, dental damage, aspiration, cardiac arrest, difficult intubation, difficult I.V. access or unexpected hospital admissions.  NO malignant hyperthermia and or pseudocholinesterase deficiency.  No history of blood transfusions     The patient is not a Bahai and will accept blood and blood products if medically indicated.   Type and screen sent.     Past Medical History:   Diagnosis Date    Age-related nuclear cataract, bilateral 2016    Cataract, nuclear sclerotic, both eyes    Aortic valve sclerosis     CAD (coronary artery disease)     CT acrdiac score= 381, followed by Dr. Hickman, cardiac clearance in [facility] from 24     Cholelithiasis     no recent issues    Constipation     TEIXEIRA (dyspnea on exertion)     improved with med adjustment    History of echocardiogram 01/10/2018    HLD (hyperlipidemia)     HTN (hypertension)     Low back pain     Lumbar radiculopathy     Lumbar stenosis with neurogenic claudication     Neuropathy     Left hip to foot-tingling    OA (osteoarthritis)     Preglaucoma, unspecified, bilateral 09/02/2015    Glaucoma suspect of both eyes    Primary open-angle glaucoma, bilateral, severe stage 07/12/2022    Primary open angle glaucoma of both eyes, severe stage    Primary open-angle glaucoma, unspecified eye, stage unspecified 03/16/2016    Primary open angle glaucoma    Seasonal rhinitis        Past Surgical History:   Procedure Laterality Date    COLONOSCOPY      SHOULDER SURGERY Left 03/04/2013    Shoulder Surgery    TOTAL KNEE ARTHROPLASTY Bilateral 03/04/2013    Knee Replacement       Patient Sexual activity questions deferred to the physician.    Family History   Problem Relation Name Age of Onset    Other (cardiac disorder) Mother      Diabetes Father      Glaucoma Father      Other (cardiac disorder) Sister      Breast cancer Sister      Breast cancer Sister      Peripheral vascular disease Sister      Glaucoma Other uncle      Social History     Socioeconomic History    Marital status:      Spouse name: Not on file    Number of children: Not on file    Years of education: Not on file    Highest education level: Not on file   Occupational History    Not on file   Tobacco Use    Smoking status: Never    Smokeless tobacco: Never   Vaping Use    Vaping status: Never Used   Substance and Sexual Activity    Alcohol use: Yes     Alcohol/week: 4.0 standard drinks of alcohol     Types: 4 Glasses of wine per week    Drug use: Never    Sexual activity: Defer   Other Topics Concern    Not on file   Social History Narrative     Lives in a home , Son Damian lives with her      Social Determinants of Health  S/p R n Y    (+) bowel prep,     Renal/Genitourinary:       Endo:     (+) type II DM, Obesity,     Psychiatric/Substance Use:  - neg psychiatric ROS     Infectious Disease:       Malignancy:       Other:  - neg other ROS          Physical Exam    Airway  airway exam normal      Mallampati: I   TM distance: > 3 FB   Neck ROM: full   Mouth opening: > 3 cm    Respiratory Devices and Support         Dental  no notable dental history         Cardiovascular   cardiovascular exam normal       Rhythm and rate: regular and normal     Pulmonary   pulmonary exam normal        breath sounds clear to auscultation           OUTSIDE LABS:  CBC:   Lab Results   Component Value Date    WBC 4.2 01/10/2022    WBC 5.7 10/04/2021    HGB 13.0 01/10/2022    HGB 13.1 10/04/2021    HCT 39.7 01/10/2022    HCT 39.1 10/04/2021     01/10/2022     10/04/2021     BMP:   Lab Results   Component Value Date     01/10/2022     10/04/2021    POTASSIUM 4.0 01/10/2022    POTASSIUM 4.5 10/04/2021    CHLORIDE 109 01/10/2022    CHLORIDE 108 10/04/2021    CO2 25 01/10/2022    CO2 30 10/04/2021    BUN 20 01/10/2022    BUN 17 10/04/2021    CR 0.59 01/10/2022    CR 0.49 (L) 10/04/2021     (H) 01/10/2022     (H) 10/04/2021     COAGS: No results found for: PTT, INR, FIBR  POC:   Lab Results   Component Value Date    HCG Negative 05/02/2018     HEPATIC:   Lab Results   Component Value Date    ALBUMIN 3.9 01/10/2022    PROTTOTAL 7.2 01/10/2022    ALT 27 01/10/2022    AST 14 01/10/2022    ALKPHOS 96 01/10/2022    BILITOTAL 0.4 01/10/2022     OTHER:   Lab Results   Component Value Date    A1C 5.8 (H) 01/14/2021    MANUEL 8.9 01/10/2022    MAG 2.2 06/26/2021    TSH 1.24 08/09/2021    SED 4 08/09/2021       Anesthesia Plan    ASA Status:  3   NPO Status:  NPO Appropriate    Anesthesia Type: MAC.     - Reason for MAC: straight local not clinically adequate   Induction: Intravenous, Propofol.   Maintenance: TIVA.        Consents             Postoperative Care            Comments:                Dimple Tubbs, APRN CRNA       Financial Resource Strain: Not on file   Food Insecurity: Not on file   Transportation Needs: Not on file   Physical Activity: Not on file   Stress: Not on file   Social Connections: Not on file   Intimate Partner Violence: Not on file   Housing Stability: Not on file        No Known Allergies    Current Outpatient Medications:     acetaminophen (Tylenol 8 HOUR) 650 mg ER tablet, Take 1 tablet (650 mg) by mouth every 8 hours if needed for mild pain (1 - 3). Do not crush, chew, or split., Disp: , Rfl:     aspirin 81 mg EC tablet, Take 1 tablet (81 mg) by mouth once daily., Disp: , Rfl:     atenolol (Tenormin) 25 mg tablet, Take 1 tablet (25 mg) by mouth once daily., Disp: 90 tablet, Rfl: 3    atorvastatin (Lipitor) 80 mg tablet, Take 1 tablet (80 mg) by mouth once daily., Disp: 90 tablet, Rfl: 3    cetirizine (ZyrTEC) 10 mg tablet, Take 1 tablet (10 mg) by mouth once daily in the morning. (Patient taking differently: Take 1 tablet (10 mg) by mouth if needed.), Disp: 90 tablet, Rfl: 3    cholecalciferol (Vitamin D-3) 50 MCG (2000 UT) tablet, Take 1 tablet (50 mcg) by mouth once daily., Disp: , Rfl:     ezetimibe (Zetia) 10 mg tablet, Take 1 tablet (10 mg) by mouth once daily., Disp: 90 tablet, Rfl: 3    fluticasone (Flonase) 50 mcg/actuation nasal spray, Administer 1 spray into each nostril once daily. (Patient taking differently: Administer 1 spray into each nostril if needed.), Disp: 16 g, Rfl: 3    hydroCHLOROthiazide (HYDRODiuril) 25 mg tablet, Take 1 tablet (25 mg) by mouth once daily., Disp: 90 tablet, Rfl: 3    latanoprost (Xalatan) 0.005 % ophthalmic solution, Administer 1 drop into both eyes once daily at bedtime., Disp: 2.5 mL, Rfl: 11    losartan (Cozaar) 50 mg tablet, Take 1 tablet (50 mg) by mouth once daily., Disp: 90 tablet, Rfl: 1    NIFEdipine ER (Adalat CC) 30 mg 24 hr tablet, Take 1 tablet (30 mg) by mouth once daily., Disp: 90 tablet, Rfl: 1    timolol (Timoptic) 0.5 % ophthalmic solution,  Administer 1 drop into affected eye(s) once daily. Instill one gtt o.u. qam, Disp: 2.5 mL, Rfl: 11    chlorhexidine (Peridex) 0.12 % solution, Swish for 30 seconds and spit 15mL of solution the night before and morning of surgery, Disp: 475 mL, Rfl: 0      PAT ROS:   Constitutional:    no fever   no chills   no unexpected weight change  Neuro/Psych:    no numbness   no weakness   no light-headedness   no confusion  Eyes:    no discharge   no pain   no vision loss   no diplopia   no visual disturbance   use of corrective lenses  Ears:    no ear pain   no hearing loss   no tinnitus  Nose:    no nasal discharge   no sinus congestion   no epistaxis  Mouth:    no dental issues   no mouth pain   no oral bleeding   no mouth lesions  Throat:    no throat pain   no dysphagia  Neck:    no neck pain   no neck stiffness  Cardio:    Functional 4 Mets. Patient denies SOB walking up 1 flights of stairs   Cooking, cleaning, grocery shopping   no chest pain   no palpitations   no peripheral edema   no dyspnea   no TEIXEIRA  Respiratory:    no cough   no wheezing   no hemoptysis   no shortness of breath  Endocrine:    no cold intolerance   no heat intolerance  GI:    no abdominal distention   no abdominal pain   no constipation   no diarrhea   no nausea   no vomiting   no blood in stool  :    no difficulty urinating   no dysuria   no oliguria  Musculoskeletal:    arthralgias (LBP)   myalgias (LLE)   no decreased ROM  Hematologic:    does not bruise/bleed easily   no excessive bleeding   no history of blood transfusion   no blood clots  Skin:   no skin changes   no sores/wound   no rash      Physical Exam  Constitutional:       General: She is not in acute distress.     Appearance: Normal appearance. She is not ill-appearing, toxic-appearing or diaphoretic.   HENT:      Head: Normocephalic and atraumatic.      Nose: Nose normal. No rhinorrhea.      Mouth/Throat:      Pharynx: No oropharyngeal exudate.   Eyes:      Extraocular Movements:  "Extraocular movements intact.      Conjunctiva/sclera: Conjunctivae normal.   Cardiovascular:      Rate and Rhythm: Normal rate and regular rhythm.      Heart sounds: No murmur heard.     No friction rub. No gallop.      Comments: Functional 4 Mets. Patient denies SOB walking up 2 flights of stairs     Pulmonary:      Effort: Pulmonary effort is normal. No respiratory distress.      Breath sounds: Normal breath sounds. No stridor. No wheezing or rhonchi.   Abdominal:      General: Bowel sounds are normal. There is no distension.      Palpations: Abdomen is soft. There is no mass.      Tenderness: There is no abdominal tenderness. There is no guarding or rebound.      Hernia: No hernia is present.   Musculoskeletal:         General: Tenderness (LBP which is worse with lumbar spine extension) present. No swelling, deformity or signs of injury. Normal range of motion.      Cervical back: Normal range of motion and neck supple. No rigidity or tenderness.   Skin:     General: Skin is warm and dry.      Coloration: Skin is not jaundiced or pale.      Findings: No bruising, erythema, lesion or rash.   Neurological:      General: No focal deficit present.      Mental Status: She is alert and oriented to person, place, and time.      Cranial Nerves: No cranial nerve deficit.      Sensory: No sensory deficit.      Motor: No weakness.      Coordination: Coordination normal.   Psychiatric:         Mood and Affect: Mood normal.         Behavior: Behavior normal.          PAT AIRWAY:   Airway:     Mallampati::  II    Neck ROM::  Full   Multiple missing teeth; no loose teeth; no dentures or partials      Visit Vitals  /54   Pulse 55   Temp 35.5 °C (95.9 °F)   Resp 16   Ht 1.651 m (5' 5\")   Wt 78.2 kg (172 lb 6.4 oz)   SpO2 98%   BMI 28.69 kg/m²   Smoking Status Never   BSA 1.89 m²      LABS:   Contains abnormal data Urinalysis with Reflex Culture and Microscopic  Order: [id number] - Part of Panel Order [id number]   Status: " Final result       Visible to patient: Yes (not seen)       Dx: Preop testing    0 Result Notes      Component  Ref Range & Units 14:43   Color, Urine  Light-Yellow, Yellow, Dark-Yellow Light-Yellow   Appearance, Urine  Clear Clear   Specific Gravity, Urine  1.005 - 1.035 1.017   pH, Urine  5.0, 5.5, 6.0, 6.5, 7.0, 7.5, 8.0 6.5   Protein, Urine  NEGATIVE, 10 (TRACE), 20 (TRACE) mg/dL NEGATIVE   Glucose, Urine  Normal mg/dL Normal   Blood, Urine  NEGATIVE NEGATIVE   Ketones, Urine  NEGATIVE mg/dL NEGATIVE   Bilirubin, Urine  NEGATIVE NEGATIVE   Urobilinogen, Urine  Normal mg/dL Normal   Nitrite, Urine  NEGATIVE NEGATIVE   Leukocyte Esterase, Urine  NEGATIVE 250 Sergio/µL Abnormal    Resulting Agency [facility]                Specimen Collected: 07/22/24 14:43      Contains abnormal data Microscopic Only, Urine  Order: [id number] - Reflex for Order [id number]   Status: Final result       Visible to patient: Yes (not seen)       Dx: Preop testing    0 Result Notes      Component  Ref Range & Units 14:43   WBC, Urine  1-5, NONE /HPF 6-10 Abnormal    RBC, Urine  NONE, 1-2, 3-5 /HPF 1-2   Squamous Epithelial Cells, Urine  Reference range not established. /HPF 1-9 (SPARSE)   Mucus, Urine  Reference range not established. /LPF FEW   Resulting Agency [facility]                Specimen Collected: 07/22/24 14:43        Lab Results   Component Value Date    WBC 6.9 07/22/2024    HGB 12.4 07/22/2024    HCT 37.3 07/22/2024    MCV 91 07/22/2024     07/22/2024      Lab Results   Component Value Date    GLUCOSE 99 07/22/2024    CALCIUM 9.7 07/22/2024     (L) 07/22/2024    K 4.1 07/22/2024    CO2 22 07/22/2024     07/22/2024    BUN 24 (H) 07/22/2024    CREATININE 1.09 (H) 07/22/2024      Lab Results   Component Value Date    HGBA1C 5.4 07/22/2024      Assessment and Plan:     Patient is a 83-year-old female scheduled for a L4-L5 Minimally Invasive Lumbar Laminectomy;Medial Facetectomy with Dr. Estrella on 8/5/24.    Patient has no  "active cardiac symptoms.   Patient denies any chest pain, tightness, heaviness, pressure, radiating pain, palpitations, irregular heartbeats, lightheadedness, cough, congestion, shortness of breath, TEIXEIRA, PND, near syncope, weight loss or gain.     RCRI  0  , 3.9 % Risk of MACE    Cardiac:  CAD - CT cardiac score 381 12/12/18; cont ASA 81mg during periop period, including dos     HTN - losartan HOLD evening prior to surgery and morning of surgery  ; cont atenolol, nifedipine and hydrochlorothiazide on dos   Encouraged lifestyle modifications, low-sodium diet, and increase activity as tolerated.  Monitor BP and follow up with managing physician for readings sustaining >140/90.    HLD - hold ezetimibe on dos     Pt follows with Dr. Hickman and LOV note 7/11/24 states:    \"Preoperative evaluation:  Low-moderate risk patient from heart standpoint but medically optimized for lumbar spinal surgery on current medications. Can hold aspirin 81 mg a day for 7 days before operation. Continue hypertension and cholesterol medications in perioperative period.\"    Renal:  CKDIIIA  Recommendations to avoid nephrotoxic drugs and carefully monitor fluid status to maintain euvolemia. Use dose adjusted medications as needed for the underlying level of renal function.    7/22/24 Crt 1.09; GFR 51    Hematology:  Hyponatremia  7/23/24 Na 134    Patient instructed to ambulate as soon as possible postoperatively to decrease thromboembolic risk.   Initiate mechanical DVT prophylaxis as soon as possible and initiate chemical prophylaxis when deemed safe from a bleeding standpoint post surgery.     LABS: CBC, BMP, T&S,MRSA, A1c, UA flex ordered. Lab results reviewed and show possible UTI; Ucx pending.  Otherwise unremarkable.     Followup: MRSA, BMP, UCx and A1c pending    Addendum 7/23/24:  CMP and A1c results reviewed and show CKD and hyponatremia; otherwise unremarkable.     STOP BANG: male, HTN, >50 = 3    Caprini: 6    Risk assessment " complete.  Patient is scheduled for a intermediate surgical risk procedure.        Preoperative medication instructions were provided and reviewed with the patient.  Any additional testing or evaluation was explained to the patient.  Nothing by mouth instructions were discussed and patient's questions were answered prior to conclusion to this encounter.  Patient verbalized understanding of preoperative instructions given in preadmission testing; discharge instructions available in EMR.    This note was dictated by a speech recognition.  Minor errors may have been detected in a speech recognition.

## 2024-07-22 NOTE — CPM/PAT NURSE NOTE
CPM/PAT Nurse Note      Name: Melita Raya (Melita Raya)  /Age: 1940/83 y.o.       Past Medical History:   Diagnosis Date    Age-related nuclear cataract, bilateral 2016    Cataract, nuclear sclerotic, both eyes    Aortic valve sclerosis     CAD (coronary artery disease)     CT acrdiac score= 381, followed by Dr. Hickman, cardiac clearance in [facility] from 24    Cholelithiasis     no recent issues    Constipation     TEIXEIRA (dyspnea on exertion)     improved with med adjustment    History of echocardiogram 01/10/2018    HLD (hyperlipidemia)     HTN (hypertension)     Low back pain     Lumbar radiculopathy     Lumbar stenosis with neurogenic claudication     Neuropathy     Left hip to foot-tingling    OA (osteoarthritis)     Preglaucoma, unspecified, bilateral 2015    Glaucoma suspect of both eyes    Primary open-angle glaucoma, bilateral, severe stage 2022    Primary open angle glaucoma of both eyes, severe stage    Primary open-angle glaucoma, unspecified eye, stage unspecified 2016    Primary open angle glaucoma    Seasonal rhinitis        Past Surgical History:   Procedure Laterality Date    COLONOSCOPY      SHOULDER SURGERY Left 2013    Shoulder Surgery    TOTAL KNEE ARTHROPLASTY Bilateral 2013    Knee Replacement       Patient Sexual activity questions deferred to the physician.    Family History   Problem Relation Name Age of Onset    Other (cardiac disorder) Mother      Diabetes Father      Glaucoma Father      Other (cardiac disorder) Sister      Breast cancer Sister      Breast cancer Sister      Peripheral vascular disease Sister      Glaucoma Other uncle        No Known Allergies    Prior to Admission medications    Medication Sig Start Date End Date Taking? Authorizing Provider   acetaminophen (Tylenol 8 HOUR) 650 mg ER tablet Take 1 tablet (650 mg) by mouth every 8 hours if needed for mild pain (1 - 3). Do not crush, chew, or split.    Historical  Provider, MD   aspirin 81 mg EC tablet Take 1 tablet (81 mg) by mouth once daily. 9/11/14   Historical Provider, MD   atenolol (Tenormin) 25 mg tablet Take 1 tablet (25 mg) by mouth once daily. 2/12/24   Negro Hickman MD   atorvastatin (Lipitor) 80 mg tablet Take 1 tablet (80 mg) by mouth once daily. 10/11/23   Negro Hickman MD   cetirizine (ZyrTEC) 10 mg tablet Take 1 tablet (10 mg) by mouth once daily in the morning.  Patient taking differently: Take 1 tablet (10 mg) by mouth if needed. 5/9/23   Casimiro Howell MD   chlorhexidine (Peridex) 0.12 % solution Swish for 30 seconds and spit 15mL of solution the night before and morning of surgery 7/22/24   Shara Maya PA-C   cholecalciferol (Vitamin D-3) 50 MCG (2000 UT) tablet Take 1 tablet (50 mcg) by mouth once daily.    Historical Provider, MD   ezetimibe (Zetia) 10 mg tablet Take 1 tablet (10 mg) by mouth once daily. 7/15/24   Negro Hickman MD   fluticasone (Flonase) 50 mcg/actuation nasal spray Administer 1 spray into each nostril once daily.  Patient taking differently: Administer 1 spray into each nostril if needed. 4/9/24   Yoly Valente MD   hydroCHLOROthiazide (HYDRODiuril) 25 mg tablet Take 1 tablet (25 mg) by mouth once daily. 2/13/24   Negro Hickman MD   latanoprost (Xalatan) 0.005 % ophthalmic solution Administer 1 drop into both eyes once daily at bedtime. 7/8/24   Cristian Arrieta MD   losartan (Cozaar) 50 mg tablet Take 1 tablet (50 mg) by mouth once daily. 5/31/24   Yoly Valente MD   NIFEdipine ER (Adalat CC) 30 mg 24 hr tablet Take 1 tablet (30 mg) by mouth once daily. 5/31/24   Yoly Valente MD   timolol (Timoptic) 0.5 % ophthalmic solution Administer 1 drop into affected eye(s) once daily. Instill one gtt o.u. qam 5/20/24   Cristina Arrieta MD        PAT ROS     DASI Risk Score    No data to display       Caprini DVT Assessment    No data to display       Modified Frailty Index    No data to  display       CHADS2 Stroke Risk  Current as of today        N/A 3 to 100%: High Risk   2 to < 3%: Medium Risk   0 to < 2%: Low Risk     Last Change: N/A          This score determines the patient's risk of having a stroke if the patient has atrial fibrillation.        This score is not applicable to this patient. Components are not calculated.          Revised Cardiac Risk Index    No data to display       Apfel Simplified Score    No data to display       Risk Analysis Index Results This Encounter    No data found in the last 1 encounters.         Nurse Plan of Action:       .mrsa

## 2024-07-22 NOTE — H&P (VIEW-ONLY)
[facility]       Name: Melita Raya (Melita Raya)  /Age: 1940/83 y.o.       Date of Consult: 24     Referring Provider: Dr. Estrella    Surgery, Date, and Length: L4-L5 Minimally Invasive Lumbar Laminectomy;Medial Facetectomy , 24, 150MIN    Melita Raya is a 83 year-old female who presents to the [facility] for perioperative risk assessment prior to surgery.    Patient presents with a primary diagnosis of lumbar spinal stenosis. LOW BACK PAIN radiation to LEFT LEG  that started about  3  year  ago, and have been gradually worsening since that time.  The symptoms started after no known injury     The pain is 8 /10. The pain is described as burning, soreness, stiffness, and tingling and occurs intermittently.  Symptoms are exacerbated by standing. Factors which relieve the pain include acetaminophen, change in body position, and exercise. She has tried PT which has only helped marginally with her pain.  Pt wishes to proceed with surgery as planned.     This note was created in part upon personal review of patient's medical records.      Patient is scheduled to have L4-L5 Minimally Invasive Lumbar Laminectomy;Medial Facetectomy      Pt denies any past history of anesthetic complications such as PONV, awareness, prolonged sedation, dental damage, aspiration, cardiac arrest, difficult intubation, difficult I.V. access or unexpected hospital admissions.  NO malignant hyperthermia and or pseudocholinesterase deficiency.  No history of blood transfusions     The patient is not a Oriental orthodox and will accept blood and blood products if medically indicated.   Type and screen sent.     Past Medical History:   Diagnosis Date    Age-related nuclear cataract, bilateral 2016    Cataract, nuclear sclerotic, both eyes    Aortic valve sclerosis     CAD (coronary artery disease)     CT acrdiac score= 381, followed by Dr. Hickman, cardiac clearance in [facility] from 24     Cholelithiasis     no recent issues    Constipation     TEIXEIRA (dyspnea on exertion)     improved with med adjustment    History of echocardiogram 01/10/2018    HLD (hyperlipidemia)     HTN (hypertension)     Low back pain     Lumbar radiculopathy     Lumbar stenosis with neurogenic claudication     Neuropathy     Left hip to foot-tingling    OA (osteoarthritis)     Preglaucoma, unspecified, bilateral 09/02/2015    Glaucoma suspect of both eyes    Primary open-angle glaucoma, bilateral, severe stage 07/12/2022    Primary open angle glaucoma of both eyes, severe stage    Primary open-angle glaucoma, unspecified eye, stage unspecified 03/16/2016    Primary open angle glaucoma    Seasonal rhinitis        Past Surgical History:   Procedure Laterality Date    COLONOSCOPY      SHOULDER SURGERY Left 03/04/2013    Shoulder Surgery    TOTAL KNEE ARTHROPLASTY Bilateral 03/04/2013    Knee Replacement       Patient Sexual activity questions deferred to the physician.    Family History   Problem Relation Name Age of Onset    Other (cardiac disorder) Mother      Diabetes Father      Glaucoma Father      Other (cardiac disorder) Sister      Breast cancer Sister      Breast cancer Sister      Peripheral vascular disease Sister      Glaucoma Other uncle      Social History     Socioeconomic History    Marital status:      Spouse name: Not on file    Number of children: Not on file    Years of education: Not on file    Highest education level: Not on file   Occupational History    Not on file   Tobacco Use    Smoking status: Never    Smokeless tobacco: Never   Vaping Use    Vaping status: Never Used   Substance and Sexual Activity    Alcohol use: Yes     Alcohol/week: 4.0 standard drinks of alcohol     Types: 4 Glasses of wine per week    Drug use: Never    Sexual activity: Defer   Other Topics Concern    Not on file   Social History Narrative     Lives in a home , Son Damian lives with her      Social Determinants of Health      Financial Resource Strain: Not on file   Food Insecurity: Not on file   Transportation Needs: Not on file   Physical Activity: Not on file   Stress: Not on file   Social Connections: Not on file   Intimate Partner Violence: Not on file   Housing Stability: Not on file        No Known Allergies    Current Outpatient Medications:     acetaminophen (Tylenol 8 HOUR) 650 mg ER tablet, Take 1 tablet (650 mg) by mouth every 8 hours if needed for mild pain (1 - 3). Do not crush, chew, or split., Disp: , Rfl:     aspirin 81 mg EC tablet, Take 1 tablet (81 mg) by mouth once daily., Disp: , Rfl:     atenolol (Tenormin) 25 mg tablet, Take 1 tablet (25 mg) by mouth once daily., Disp: 90 tablet, Rfl: 3    atorvastatin (Lipitor) 80 mg tablet, Take 1 tablet (80 mg) by mouth once daily., Disp: 90 tablet, Rfl: 3    cetirizine (ZyrTEC) 10 mg tablet, Take 1 tablet (10 mg) by mouth once daily in the morning. (Patient taking differently: Take 1 tablet (10 mg) by mouth if needed.), Disp: 90 tablet, Rfl: 3    cholecalciferol (Vitamin D-3) 50 MCG (2000 UT) tablet, Take 1 tablet (50 mcg) by mouth once daily., Disp: , Rfl:     ezetimibe (Zetia) 10 mg tablet, Take 1 tablet (10 mg) by mouth once daily., Disp: 90 tablet, Rfl: 3    fluticasone (Flonase) 50 mcg/actuation nasal spray, Administer 1 spray into each nostril once daily. (Patient taking differently: Administer 1 spray into each nostril if needed.), Disp: 16 g, Rfl: 3    hydroCHLOROthiazide (HYDRODiuril) 25 mg tablet, Take 1 tablet (25 mg) by mouth once daily., Disp: 90 tablet, Rfl: 3    latanoprost (Xalatan) 0.005 % ophthalmic solution, Administer 1 drop into both eyes once daily at bedtime., Disp: 2.5 mL, Rfl: 11    losartan (Cozaar) 50 mg tablet, Take 1 tablet (50 mg) by mouth once daily., Disp: 90 tablet, Rfl: 1    NIFEdipine ER (Adalat CC) 30 mg 24 hr tablet, Take 1 tablet (30 mg) by mouth once daily., Disp: 90 tablet, Rfl: 1    timolol (Timoptic) 0.5 % ophthalmic solution,  Administer 1 drop into affected eye(s) once daily. Instill one gtt o.u. qam, Disp: 2.5 mL, Rfl: 11    chlorhexidine (Peridex) 0.12 % solution, Swish for 30 seconds and spit 15mL of solution the night before and morning of surgery, Disp: 475 mL, Rfl: 0      PAT ROS:   Constitutional:    no fever   no chills   no unexpected weight change  Neuro/Psych:    no numbness   no weakness   no light-headedness   no confusion  Eyes:    no discharge   no pain   no vision loss   no diplopia   no visual disturbance   use of corrective lenses  Ears:    no ear pain   no hearing loss   no tinnitus  Nose:    no nasal discharge   no sinus congestion   no epistaxis  Mouth:    no dental issues   no mouth pain   no oral bleeding   no mouth lesions  Throat:    no throat pain   no dysphagia  Neck:    no neck pain   no neck stiffness  Cardio:    Functional 4 Mets. Patient denies SOB walking up 1 flights of stairs   Cooking, cleaning, grocery shopping   no chest pain   no palpitations   no peripheral edema   no dyspnea   no TEIXEIRA  Respiratory:    no cough   no wheezing   no hemoptysis   no shortness of breath  Endocrine:    no cold intolerance   no heat intolerance  GI:    no abdominal distention   no abdominal pain   no constipation   no diarrhea   no nausea   no vomiting   no blood in stool  :    no difficulty urinating   no dysuria   no oliguria  Musculoskeletal:    arthralgias (LBP)   myalgias (LLE)   no decreased ROM  Hematologic:    does not bruise/bleed easily   no excessive bleeding   no history of blood transfusion   no blood clots  Skin:   no skin changes   no sores/wound   no rash      Physical Exam  Constitutional:       General: She is not in acute distress.     Appearance: Normal appearance. She is not ill-appearing, toxic-appearing or diaphoretic.   HENT:      Head: Normocephalic and atraumatic.      Nose: Nose normal. No rhinorrhea.      Mouth/Throat:      Pharynx: No oropharyngeal exudate.   Eyes:      Extraocular Movements:  "Extraocular movements intact.      Conjunctiva/sclera: Conjunctivae normal.   Cardiovascular:      Rate and Rhythm: Normal rate and regular rhythm.      Heart sounds: No murmur heard.     No friction rub. No gallop.      Comments: Functional 4 Mets. Patient denies SOB walking up 2 flights of stairs     Pulmonary:      Effort: Pulmonary effort is normal. No respiratory distress.      Breath sounds: Normal breath sounds. No stridor. No wheezing or rhonchi.   Abdominal:      General: Bowel sounds are normal. There is no distension.      Palpations: Abdomen is soft. There is no mass.      Tenderness: There is no abdominal tenderness. There is no guarding or rebound.      Hernia: No hernia is present.   Musculoskeletal:         General: Tenderness (LBP which is worse with lumbar spine extension) present. No swelling, deformity or signs of injury. Normal range of motion.      Cervical back: Normal range of motion and neck supple. No rigidity or tenderness.   Skin:     General: Skin is warm and dry.      Coloration: Skin is not jaundiced or pale.      Findings: No bruising, erythema, lesion or rash.   Neurological:      General: No focal deficit present.      Mental Status: She is alert and oriented to person, place, and time.      Cranial Nerves: No cranial nerve deficit.      Sensory: No sensory deficit.      Motor: No weakness.      Coordination: Coordination normal.   Psychiatric:         Mood and Affect: Mood normal.         Behavior: Behavior normal.          PAT AIRWAY:   Airway:     Mallampati::  II    Neck ROM::  Full   Multiple missing teeth; no loose teeth; no dentures or partials      Visit Vitals  /54   Pulse 55   Temp 35.5 °C (95.9 °F)   Resp 16   Ht 1.651 m (5' 5\")   Wt 78.2 kg (172 lb 6.4 oz)   SpO2 98%   BMI 28.69 kg/m²   Smoking Status Never   BSA 1.89 m²      LABS:   Contains abnormal data Urinalysis with Reflex Culture and Microscopic  Order: [id number] - Part of Panel Order [id number]   Status: " Final result       Visible to patient: Yes (not seen)       Dx: Preop testing    0 Result Notes      Component  Ref Range & Units 14:43   Color, Urine  Light-Yellow, Yellow, Dark-Yellow Light-Yellow   Appearance, Urine  Clear Clear   Specific Gravity, Urine  1.005 - 1.035 1.017   pH, Urine  5.0, 5.5, 6.0, 6.5, 7.0, 7.5, 8.0 6.5   Protein, Urine  NEGATIVE, 10 (TRACE), 20 (TRACE) mg/dL NEGATIVE   Glucose, Urine  Normal mg/dL Normal   Blood, Urine  NEGATIVE NEGATIVE   Ketones, Urine  NEGATIVE mg/dL NEGATIVE   Bilirubin, Urine  NEGATIVE NEGATIVE   Urobilinogen, Urine  Normal mg/dL Normal   Nitrite, Urine  NEGATIVE NEGATIVE   Leukocyte Esterase, Urine  NEGATIVE 250 Sergio/µL Abnormal    Resulting Agency [facility]                Specimen Collected: 07/22/24 14:43      Contains abnormal data Microscopic Only, Urine  Order: [id number] - Reflex for Order [id number]   Status: Final result       Visible to patient: Yes (not seen)       Dx: Preop testing    0 Result Notes      Component  Ref Range & Units 14:43   WBC, Urine  1-5, NONE /HPF 6-10 Abnormal    RBC, Urine  NONE, 1-2, 3-5 /HPF 1-2   Squamous Epithelial Cells, Urine  Reference range not established. /HPF 1-9 (SPARSE)   Mucus, Urine  Reference range not established. /LPF FEW   Resulting Agency [facility]                Specimen Collected: 07/22/24 14:43        Lab Results   Component Value Date    WBC 6.9 07/22/2024    HGB 12.4 07/22/2024    HCT 37.3 07/22/2024    MCV 91 07/22/2024     07/22/2024      Lab Results   Component Value Date    GLUCOSE 99 07/22/2024    CALCIUM 9.7 07/22/2024     (L) 07/22/2024    K 4.1 07/22/2024    CO2 22 07/22/2024     07/22/2024    BUN 24 (H) 07/22/2024    CREATININE 1.09 (H) 07/22/2024      Lab Results   Component Value Date    HGBA1C 5.4 07/22/2024      Assessment and Plan:     Patient is a 83-year-old female scheduled for a L4-L5 Minimally Invasive Lumbar Laminectomy;Medial Facetectomy with Dr. Estrella on 8/5/24.    Patient has no  "active cardiac symptoms.   Patient denies any chest pain, tightness, heaviness, pressure, radiating pain, palpitations, irregular heartbeats, lightheadedness, cough, congestion, shortness of breath, TEIXEIRA, PND, near syncope, weight loss or gain.     RCRI  0  , 3.9 % Risk of MACE    Cardiac:  CAD - CT cardiac score 381 12/12/18; cont ASA 81mg during periop period, including dos     HTN - losartan HOLD evening prior to surgery and morning of surgery  ; cont atenolol, nifedipine and hydrochlorothiazide on dos   Encouraged lifestyle modifications, low-sodium diet, and increase activity as tolerated.  Monitor BP and follow up with managing physician for readings sustaining >140/90.    HLD - hold ezetimibe on dos     Pt follows with Dr. Hickman and LOV note 7/11/24 states:    \"Preoperative evaluation:  Low-moderate risk patient from heart standpoint but medically optimized for lumbar spinal surgery on current medications. Can hold aspirin 81 mg a day for 7 days before operation. Continue hypertension and cholesterol medications in perioperative period.\"    Renal:  CKDIIIA  Recommendations to avoid nephrotoxic drugs and carefully monitor fluid status to maintain euvolemia. Use dose adjusted medications as needed for the underlying level of renal function.    7/22/24 Crt 1.09; GFR 51    Hematology:  Hyponatremia  7/23/24 Na 134    Patient instructed to ambulate as soon as possible postoperatively to decrease thromboembolic risk.   Initiate mechanical DVT prophylaxis as soon as possible and initiate chemical prophylaxis when deemed safe from a bleeding standpoint post surgery.     LABS: CBC, BMP, T&S,MRSA, A1c, UA flex ordered. Lab results reviewed and show possible UTI; Ucx pending.  Otherwise unremarkable.     Followup: MRSA, BMP, UCx and A1c pending    Addendum 7/23/24:  CMP and A1c results reviewed and show CKD and hyponatremia; otherwise unremarkable.     STOP BANG: male, HTN, >50 = 3    Caprini: 6    Risk assessment " complete.  Patient is scheduled for a intermediate surgical risk procedure.        Preoperative medication instructions were provided and reviewed with the patient.  Any additional testing or evaluation was explained to the patient.  Nothing by mouth instructions were discussed and patient's questions were answered prior to conclusion to this encounter.  Patient verbalized understanding of preoperative instructions given in preadmission testing; discharge instructions available in EMR.    This note was dictated by a speech recognition.  Minor errors may have been detected in a speech recognition.

## 2024-07-22 NOTE — CPM/PAT NURSE NOTE
CPM/PAT Nurse Note      Name: Melita Raya (Melita Raya)  /Age: 1940/83 y.o.       Past Medical History:   Diagnosis Date    Age-related nuclear cataract, bilateral 2016    Cataract, nuclear sclerotic, both eyes    Aortic valve sclerosis     CAD (coronary artery disease)     CT acrdiac score= 381, followed by Dr. Hickman, cardiac clearance in [facility] from 24    Cholelithiasis     no recent issues    Constipation     TEIXEIRA (dyspnea on exertion)     improved with med adjustment    History of echocardiogram 01/10/2018    HLD (hyperlipidemia)     HTN (hypertension)     Low back pain     Lumbar radiculopathy     Lumbar stenosis with neurogenic claudication     Neuropathy     Left hip to foot-tingling    OA (osteoarthritis)     Preglaucoma, unspecified, bilateral 2015    Glaucoma suspect of both eyes    Primary open-angle glaucoma, bilateral, severe stage 2022    Primary open angle glaucoma of both eyes, severe stage    Primary open-angle glaucoma, unspecified eye, stage unspecified 2016    Primary open angle glaucoma    Seasonal rhinitis        Past Surgical History:   Procedure Laterality Date    COLONOSCOPY      SHOULDER SURGERY Left 2013    Shoulder Surgery    TOTAL KNEE ARTHROPLASTY Bilateral 2013    Knee Replacement       Patient Sexual activity questions deferred to the physician.    Family History   Problem Relation Name Age of Onset    Other (cardiac disorder) Mother      Diabetes Father      Glaucoma Father      Other (cardiac disorder) Sister      Breast cancer Sister      Breast cancer Sister      Peripheral vascular disease Sister      Glaucoma Other uncle        No Known Allergies    Prior to Admission medications    Medication Sig Start Date End Date Taking? Authorizing Provider   acetaminophen (Tylenol 8 HOUR) 650 mg ER tablet Take 1 tablet (650 mg) by mouth every 8 hours if needed for mild pain (1 - 3). Do not crush, chew, or split.    Historical  Provider, MD   aspirin 81 mg EC tablet Take 1 tablet (81 mg) by mouth once daily. 9/11/14   Historical Provider, MD   atenolol (Tenormin) 25 mg tablet Take 1 tablet (25 mg) by mouth once daily. 2/12/24   Negro Hickman MD   atorvastatin (Lipitor) 80 mg tablet Take 1 tablet (80 mg) by mouth once daily. 10/11/23   Negro Hickman MD   cetirizine (ZyrTEC) 10 mg tablet Take 1 tablet (10 mg) by mouth once daily in the morning.  Patient taking differently: Take 1 tablet (10 mg) by mouth if needed. 5/9/23   Casimiro Howell MD   cholecalciferol (Vitamin D-3) 50 MCG (2000 UT) tablet Take 1 tablet (50 mcg) by mouth once daily.    Historical Provider, MD   ezetimibe (Zetia) 10 mg tablet Take 1 tablet (10 mg) by mouth once daily. 7/15/24   Negro Hickman MD   fluticasone (Flonase) 50 mcg/actuation nasal spray Administer 1 spray into each nostril once daily.  Patient taking differently: Administer 1 spray into each nostril if needed. 4/9/24   Yoly Valente MD   hydroCHLOROthiazide (HYDRODiuril) 25 mg tablet Take 1 tablet (25 mg) by mouth once daily. 2/13/24   Negro Hickman MD   latanoprost (Xalatan) 0.005 % ophthalmic solution Administer 1 drop into both eyes once daily at bedtime. 7/8/24   Cristian Arrieta MD   losartan (Cozaar) 50 mg tablet Take 1 tablet (50 mg) by mouth once daily. 5/31/24   Yoly Valente MD   NIFEdipine ER (Adalat CC) 30 mg 24 hr tablet Take 1 tablet (30 mg) by mouth once daily. 5/31/24   Yoly Valente MD   timolol (Timoptic) 0.5 % ophthalmic solution Administer 1 drop into affected eye(s) once daily. Instill one gtt o.u. qam 5/20/24   Cristian Arrieta MD        PAT ROS     DASI Risk Score    No data to display       Caprini DVT Assessment    No data to display       Modified Frailty Index    No data to display       CHADS2 Stroke Risk  Current as of today        N/A 3 to 100%: High Risk   2 to < 3%: Medium Risk   0 to < 2%: Low Risk     Last Change: N/A          This  score determines the patient's risk of having a stroke if the patient has atrial fibrillation.        This score is not applicable to this patient. Components are not calculated.          Revised Cardiac Risk Index    No data to display       Apfel Simplified Score    No data to display       Risk Analysis Index Results This Encounter    No data found in the last 1 encounters.         Nurse Plan of Action:         .mrsa

## 2024-07-22 NOTE — PREPROCEDURE INSTRUCTIONS
Medication List            Accurate as of July 22, 2024  2:10 PM. Always use your most recent med list.                acetaminophen 650 mg ER tablet  Commonly known as: Tylenol 8 HOUR  Medication Adjustments for Surgery: Take morning of surgery with sip of water, no other fluids  Notes to patient: If needed     aspirin 81 mg EC tablet  Medication Adjustments for Surgery: Take morning of surgery with sip of water, no other fluids     atenolol 25 mg tablet  Commonly known as: Tenormin  Take 1 tablet (25 mg) by mouth once daily.  Medication Adjustments for Surgery: Take morning of surgery with sip of water, no other fluids     atorvastatin 80 mg tablet  Commonly known as: Lipitor  Take 1 tablet (80 mg) by mouth once daily.  Medication Adjustments for Surgery: Take morning of surgery with sip of water, no other fluids     cetirizine 10 mg tablet  Commonly known as: ZyrTEC  Take 1 tablet (10 mg) by mouth once daily in the morning.  Medication Adjustments for Surgery: Continue until night before surgery     chlorhexidine 0.12 % solution  Commonly known as: Peridex  Swish for 30 seconds and spit 15mL of solution the night before and morning of surgery     cholecalciferol 50 MCG (2000 UT) tablet  Commonly known as: Vitamin D-3  Medication Adjustments for Surgery: Continue until night before surgery     ezetimibe 10 mg tablet  Commonly known as: Zetia  Take 1 tablet (10 mg) by mouth once daily.  Medication Adjustments for Surgery: Continue until night before surgery     fluticasone 50 mcg/actuation nasal spray  Commonly known as: Flonase  Administer 1 spray into each nostril once daily.  Notes to patient: Ok to use morning of surgery if needed     hydroCHLOROthiazide 25 mg tablet  Commonly known as: HYDRODiuril  Take 1 tablet (25 mg) by mouth once daily.  Medication Adjustments for Surgery: Take morning of surgery with sip of water, no other fluids     latanoprost 0.005 % ophthalmic solution  Commonly known as:  Xalatan  Administer 1 drop into both eyes once daily at bedtime.  Notes to patient: Ok to use morning of surgery     losartan 50 mg tablet  Commonly known as: Cozaar  Take 1 tablet (50 mg) by mouth once daily.  Notes to patient: HOLD evening prior to surgery and morning of surgery        NIFEdipine ER 30 mg 24 hr tablet  Commonly known as: Adalat CC  Take 1 tablet (30 mg) by mouth once daily.  Medication Adjustments for Surgery: Take morning of surgery with sip of water, no other fluids     timolol 0.5 % ophthalmic solution  Commonly known as: Timoptic  Administer 1 drop into affected eye(s) once daily. Instill one gtt o.u. qa  Notes to patient: Ok to use morning of surgery                            **Concerning above medication instructions, if medication is normally taken at night, continue normal schedule.**  **DO NOT TAKE NIGHT PRIOR AND MORNING OF SURGERY**    CONTACT SURGEON'S OFFICE IF YOU DEVELOP:  * Fever = 100.4 F   * New respiratory symptoms (e.g. cough, shortness of breath, respiratory distress, sore throat)  * Recent loss of taste or smell  *Flu like symptoms such as headache, fatigue or gastrointestinal symptoms  * You develop any open sores, shingles, burning or painful urination   AND/OR:  * You no longer wish to have the surgery.  * Any other personal circumstances change that may lead to the need to cancel or defer this surgery.  *You were admitted to any hospital within one week of your planned procedure.    SMOKING:  *Quitting smoking can make a huge difference to your health and recovery from surgery.    *If you need help with quitting, call [phone]-NOW.    THE DAY BEFORE SURGERY:  *Do not eat any food after midnight the night before surgery.   *You are permitted to drink clear liquids (i.e. water, black coffee (no milk or cream), tea, apple juice and electrolyte drinks (gatorade)) up to 13.5 ounces, up to 2 hours before your arrival time.  *You may chew gum until 2 hours before your  surgery    SURGICAL TIME  *You will be contacted between 2 p.m. and 6 p.m. the business day before your surgery with your arrival time.  *If you haven't received a call by 6pm, call [phone].  *Scheduled surgery times may change and you will be notified if this occurs-check your personal voicemail for any updates.    ON THE MORNING OF SURGERY:  *Wear comfortable, loose fitting clothing.   *Do not use moisturizers, creams, lotions or perfume.  *All jewelry and valuables should be left at home.  *Prosthetic devices such as contact lenses, hearing aids, dentures, eyelash extensions, hairpins and body piercing must be removed before surgery.    BRING WITH YOU:  *Photo ID and insurance card  *Current list of medicines and allergies  *Pacemaker/Defibrillator/Heart stent cards  *CPAP machine and mask  *Slings/splints/crutches  *Copy of your complete Advanced Directive/DHPOA-if applicable  *Neurostimulator implant remote    PARKING AND ARRIVAL:  *Check in at the Main Entrance desk and let them know you are here for surgery.  *You will be directed to the 2nd floor surgical waiting area.    AFTER OUTPATIENT SURGERY:  *A responsible adult MUST accompany you at the time of discharge and stay with you for 24 hours after your surgery.  *You may NOT drive yourself home after surgery.  *You may use a taxi or ride sharing service ([employer], Uber) to return home ONLY if you are accompanied by a friend or family member.  *Instructions for resuming your medications will be provided by your surgeon.         Patient Information: Oral/Dental Rinse  **This is a prescription; pick it up at your preferred local pharmacy **  What is oral/dental rinse?   It is a mouthwash. It is a way of cleaning the mouth with a germ killing solution before your surgery.  The solution contains chlorhexidine, commonly known as CHG.   It is used inside the mouth to kill a bacteria known as Staphylococcus aureus.  Let your doctor know if you are allergic to  Chlorhexidine.    Why do I need to use CHG oral/dental rinse?  The CHG oral/dental rinse helps to kill a bacteria in your mouth known a Staphylococcus aureus.     This reduces the risk of infection at the surgical site.      Using your CHG oral/dental rinse  STEPS:  Use your CHG oral/dental rinse after you brush your teeth the night before (at bedtime) and the morning of your surgery.  Follow all directions on your prescription label.    Use the cap on the container to measure 15ml (fill cap to fill line)  Swish (gargle if you can) the mouthwash in your mouth for at least 30 seconds, (do not to swallow) spit out  After you use your CHG rinse, do not rinse your mouth with water, drink or eat.  Please refer to prescription label for the appropriate time to resume oral intake  Dental rinse comes in one size bottle: 473ml ~16oz.  You will have leftover    rinse, discard after this use.    What side effects might I have using the CHG oral/dental rinse?  CHG rinse will stick to plaque on the teeth.  Brush and floss just before use.  Teeth brushing will help avoid staining of plaque during use.    Who should I contact if I have questions about the CHG oral/dental rinse?  Please call [facility], Preadmission Testing at [phone] if you have any questions      Home Preoperative Antibacterial Shower     What is a home preoperative antibacterial shower?  This shower is a way of cleaning the skin with a germ killing soap before surgery.  The soap contains chlorhexidine, commonly known as CHG.  CHG is a soap for your skin with germ killing ability.  Let your doctor know if you are allergic to chlorhexidine.    Why do I need to take a preoperative antibacterial shower?  Skin is not sterile.  It is best to try to make your skin as free of germs as possible before surgery.  Proper cleansing with a germ killing soap before surgery can lower the number of germs on your skin.  This helps to reduce the  risk of infection at the surgical site.  Following the instructions listed below will help you prepare your skin for surgery.      How do I use the CHG skin cleanser?  Steps:  Begin using your CHG soap five days before your scheduled surgery on ________________________.    Days 1-4 Shower before bed:  Wash your face and genitals with your normal soap and rinse.    Wash and rinse your hair using the CHG soap. Rinse completely, do not condition your   Hair.          3.    Apply the CHG soap to a clean wet washcloth.  Turn the water off or move away                From the water spray to avoid premature rinsing of the CHG soap as you are applying.     4.   Lather your entire body from the neck down.  Do not use on your face or genitals.   Pay special attention to the area(s) where your incision(s) will be located unless they are on your face.  Avoid scrubbing your skin too hard.  The important point is to have the CHG soap sit on your skin for 3 minutes.    When the 3 minutes are up, turn on the water and rinse the CHG soap off your body completely.   Pat yourself dry with a clean, freshly-laundered towel.  Dress in clean, freshly laundered night clothes.    Be sure to sleep with clean, freshly laundered sheets.  Day 5:  Last shower is the morning before surgery: Follow above Instructions.    NOTE:    *Hair extensions should be removed    *Keep CHG soap out of eyes and ear canals   *DO NOT wash with regular soap on your body after you have used the CHG        soap solution  *DO NOT apply powders, lotions, or perfume.  *Deodorant may be used days 1-4, BUT NOT the day of surgery    Who should I contact if I have any questions regarding the use of CHG soap?  Call the [facility], Preadmission Testing at [phone] if you have any questions.              Patient Information: Pre-Operative Infection Prevention Measures     Why did I have my nose, under my arms and groin swabbed?  The purpose  of the swab is to identify Staphylococcus aureus inside your nose or on your skin.  The swab was sent to the laboratory for culture.  A positive swab/culture for Staphylococcus aureus is called colonization or carriage.      What is Staphylococcus aureus?  Staphylococcus aureus, also known as “staph”, is a germ found on the skin or in the nose of healthy people.  Sometimes Staphylococcus aureus can get into the body and cause an infection.  This can be minor (such as pimples, boils or other skin problems).  It might also be serious (such as blood infection, pneumonia or a surgical site infection).    What is Staphylococcus aureus colonization or carriage?  Colonization or carriage means that a person has the germ but is not sick from it.  These bacteria can be spread on the hands or when breathing or sneezing.    How is Staphylococcus aureus spread?  It is most often spread by close contact with a person or item that carries it.    What happens if my culture is positive for Staphylococcus aureus?  Your doctor/medical team will use this information to guide any antibiotic treatment which may be necessary.  Regardless of the culture results, we will clean the inside of your nose with a betadine swab just before you have your surgery.      Will I get an infection if I have Staphylococcus aureus in my nose or on my skin?  Anyone can get an infection with Staphylococcus aureus.  However, the best way to reduce your risk of infection is to follow the instructions provided to you for the use of your CHG soap and dental rinse.        Who should I contact if I have any questions?  Call the [facility], Preadmission Testing at [phone] if you have any questions.

## 2024-07-22 NOTE — CPM/PAT NURSE NOTE
CPM/PAT Nurse Note      Name: Melita Raya (Melita Raya)  /Age: 1940/83 y.o.       Past Medical History:   Diagnosis Date    Age-related nuclear cataract, bilateral 2016    Cataract, nuclear sclerotic, both eyes    Aortic valve sclerosis     CAD (coronary artery disease)     CT acrdiac score= 381, followed by Dr. Hickman, cardiac clearance in [facility] from 24    Cholelithiasis     no recent issues    Constipation     TEIXEIRA (dyspnea on exertion)     improved with med adjustment    History of echocardiogram 01/10/2018    HLD (hyperlipidemia)     HTN (hypertension)     Low back pain     Lumbar radiculopathy     Lumbar stenosis with neurogenic claudication     Neuropathy     Left hip to foot-tingling    OA (osteoarthritis)     Preglaucoma, unspecified, bilateral 2015    Glaucoma suspect of both eyes    Primary open-angle glaucoma, bilateral, severe stage 2022    Primary open angle glaucoma of both eyes, severe stage    Primary open-angle glaucoma, unspecified eye, stage unspecified 2016    Primary open angle glaucoma    Seasonal rhinitis        Past Surgical History:   Procedure Laterality Date    COLONOSCOPY      SHOULDER SURGERY Left 2013    Shoulder Surgery    TOTAL KNEE ARTHROPLASTY Bilateral 2013    Knee Replacement       Patient Sexual activity questions deferred to the physician.    Family History   Problem Relation Name Age of Onset    Other (cardiac disorder) Mother      Diabetes Father      Glaucoma Father      Other (cardiac disorder) Sister      Breast cancer Sister      Breast cancer Sister      Peripheral vascular disease Sister      Glaucoma Other uncle        No Known Allergies    Prior to Admission medications    Medication Sig Start Date End Date Taking? Authorizing Provider   acetaminophen (Tylenol 8 HOUR) 650 mg ER tablet Take 1 tablet (650 mg) by mouth every 8 hours if needed for mild pain (1 - 3). Do not crush, chew, or split.    Historical  Provider, MD   aspirin 81 mg EC tablet Take 1 tablet (81 mg) by mouth once daily. 9/11/14   Historical Provider, MD   atenolol (Tenormin) 25 mg tablet Take 1 tablet (25 mg) by mouth once daily. 2/12/24   Negro Hickman MD   atorvastatin (Lipitor) 80 mg tablet Take 1 tablet (80 mg) by mouth once daily. 10/11/23   Negro Hickman MD   cetirizine (ZyrTEC) 10 mg tablet Take 1 tablet (10 mg) by mouth once daily in the morning.  Patient taking differently: Take 1 tablet (10 mg) by mouth if needed. 5/9/23   Casimiro Hoewll MD   cholecalciferol (Vitamin D-3) 50 MCG (2000 UT) tablet Take 1 tablet (50 mcg) by mouth once daily.    Historical Provider, MD   ezetimibe (Zetia) 10 mg tablet Take 1 tablet (10 mg) by mouth once daily. 7/15/24   Negro Hickman MD   fluticasone (Flonase) 50 mcg/actuation nasal spray Administer 1 spray into each nostril once daily.  Patient taking differently: Administer 1 spray into each nostril if needed. 4/9/24   Yoly Valente MD   hydroCHLOROthiazide (HYDRODiuril) 25 mg tablet Take 1 tablet (25 mg) by mouth once daily. 2/13/24   Negro Hickman MD   latanoprost (Xalatan) 0.005 % ophthalmic solution Administer 1 drop into both eyes once daily at bedtime. 7/8/24   Cristian Arrieta MD   losartan (Cozaar) 50 mg tablet Take 1 tablet (50 mg) by mouth once daily. 5/31/24   Yoly Valente MD   NIFEdipine ER (Adalat CC) 30 mg 24 hr tablet Take 1 tablet (30 mg) by mouth once daily. 5/31/24   Yoly Valente MD   timolol (Timoptic) 0.5 % ophthalmic solution Administer 1 drop into affected eye(s) once daily. Instill one gtt o.u. qam 5/20/24   Cristian Arrieta MD        PAT ROS     DASI Risk Score    No data to display       Caprini DVT Assessment    No data to display       Modified Frailty Index    No data to display       CHADS2 Stroke Risk  Current as of today        N/A 3 to 100%: High Risk   2 to < 3%: Medium Risk   0 to < 2%: Low Risk     Last Change: N/A          This  score determines the patient's risk of having a stroke if the patient has atrial fibrillation.        This score is not applicable to this patient. Components are not calculated.          Revised Cardiac Risk Index    No data to display       Apfel Simplified Score    No data to display       Risk Analysis Index Results This Encounter    No data found in the last 1 encounters.         Nurse Plan of Action:     .gen

## 2024-07-23 LAB
BACTERIA UR CULT: NORMAL
HOLD SPECIMEN: NORMAL

## 2024-07-24 LAB — STAPHYLOCOCCUS SPEC CULT: NORMAL

## 2024-08-05 ENCOUNTER — ANESTHESIA EVENT (OUTPATIENT)
Dept: OPERATING ROOM | Facility: HOSPITAL | Age: 84
End: 2024-08-05
Payer: MEDICARE

## 2024-08-05 ENCOUNTER — HOSPITAL ENCOUNTER (OUTPATIENT)
Facility: HOSPITAL | Age: 84
Setting detail: OUTPATIENT SURGERY
Discharge: HOME | End: 2024-08-05
Attending: NEUROLOGICAL SURGERY | Admitting: NEUROLOGICAL SURGERY
Payer: MEDICARE

## 2024-08-05 ENCOUNTER — APPOINTMENT (OUTPATIENT)
Dept: RADIOLOGY | Facility: HOSPITAL | Age: 84
End: 2024-08-05
Payer: MEDICARE

## 2024-08-05 ENCOUNTER — ANESTHESIA (OUTPATIENT)
Dept: OPERATING ROOM | Facility: HOSPITAL | Age: 84
End: 2024-08-05
Payer: MEDICARE

## 2024-08-05 VITALS
SYSTOLIC BLOOD PRESSURE: 139 MMHG | HEART RATE: 57 BPM | RESPIRATION RATE: 16 BRPM | WEIGHT: 174.38 LBS | TEMPERATURE: 96.8 F | OXYGEN SATURATION: 100 % | HEIGHT: 64 IN | BODY MASS INDEX: 29.77 KG/M2 | DIASTOLIC BLOOD PRESSURE: 79 MMHG

## 2024-08-05 DIAGNOSIS — M48.062 LUMBAR STENOSIS WITH NEUROGENIC CLAUDICATION: Primary | ICD-10-CM

## 2024-08-05 DIAGNOSIS — J30.9 ALLERGIC RHINITIS, UNSPECIFIED SEASONALITY, UNSPECIFIED TRIGGER: ICD-10-CM

## 2024-08-05 DIAGNOSIS — H40.1132 PRIMARY OPEN ANGLE GLAUCOMA (POAG) OF BOTH EYES, MODERATE STAGE: Primary | ICD-10-CM

## 2024-08-05 LAB
ABO GROUP (TYPE) IN BLOOD: NORMAL
RH FACTOR (ANTIGEN D): NORMAL

## 2024-08-05 PROCEDURE — 7100000002 HC RECOVERY ROOM TIME - EACH INCREMENTAL 1 MINUTE: Performed by: NEUROLOGICAL SURGERY

## 2024-08-05 PROCEDURE — 99100 ANES PT EXTEME AGE<1 YR&>70: CPT | Performed by: ANESTHESIOLOGY

## 2024-08-05 PROCEDURE — 63047 LAM FACETEC & FORAMOT LUMBAR: CPT | Performed by: NEUROLOGICAL SURGERY

## 2024-08-05 PROCEDURE — 36415 COLL VENOUS BLD VENIPUNCTURE: CPT | Performed by: NEUROLOGICAL SURGERY

## 2024-08-05 PROCEDURE — 3600000004 HC OR TIME - INITIAL BASE CHARGE - PROCEDURE LEVEL FOUR: Performed by: NEUROLOGICAL SURGERY

## 2024-08-05 PROCEDURE — 2500000004 HC RX 250 GENERAL PHARMACY W/ HCPCS (ALT 636 FOR OP/ED): Performed by: NURSE ANESTHETIST, CERTIFIED REGISTERED

## 2024-08-05 PROCEDURE — 2500000004 HC RX 250 GENERAL PHARMACY W/ HCPCS (ALT 636 FOR OP/ED): Performed by: NEUROLOGICAL SURGERY

## 2024-08-05 PROCEDURE — 7100000010 HC PHASE TWO TIME - EACH INCREMENTAL 1 MINUTE: Performed by: NEUROLOGICAL SURGERY

## 2024-08-05 PROCEDURE — 3600000009 HC OR TIME - EACH INCREMENTAL 1 MINUTE - PROCEDURE LEVEL FOUR: Performed by: NEUROLOGICAL SURGERY

## 2024-08-05 PROCEDURE — A63047 PR LAMINEC/FACETECT/FORAMIN,LUMBAR 1 SEG: Performed by: ANESTHESIOLOGY

## 2024-08-05 PROCEDURE — 2500000005 HC RX 250 GENERAL PHARMACY W/O HCPCS: Performed by: NEUROLOGICAL SURGERY

## 2024-08-05 PROCEDURE — 3700000001 HC GENERAL ANESTHESIA TIME - INITIAL BASE CHARGE: Performed by: NEUROLOGICAL SURGERY

## 2024-08-05 PROCEDURE — 7100000001 HC RECOVERY ROOM TIME - INITIAL BASE CHARGE: Performed by: NEUROLOGICAL SURGERY

## 2024-08-05 PROCEDURE — 76000 FLUOROSCOPY <1 HR PHYS/QHP: CPT

## 2024-08-05 PROCEDURE — A63047 PR LAMINEC/FACETECT/FORAMIN,LUMBAR 1 SEG: Performed by: NURSE ANESTHETIST, CERTIFIED REGISTERED

## 2024-08-05 PROCEDURE — 3700000002 HC GENERAL ANESTHESIA TIME - EACH INCREMENTAL 1 MINUTE: Performed by: NEUROLOGICAL SURGERY

## 2024-08-05 PROCEDURE — 2780000003 HC OR 278 NO HCPCS: Performed by: NEUROLOGICAL SURGERY

## 2024-08-05 PROCEDURE — 7100000009 HC PHASE TWO TIME - INITIAL BASE CHARGE: Performed by: NEUROLOGICAL SURGERY

## 2024-08-05 PROCEDURE — C1889 IMPLANT/INSERT DEVICE, NOC: HCPCS | Performed by: NEUROLOGICAL SURGERY

## 2024-08-05 PROCEDURE — 2500000005 HC RX 250 GENERAL PHARMACY W/O HCPCS: Performed by: NURSE ANESTHETIST, CERTIFIED REGISTERED

## 2024-08-05 PROCEDURE — 2720000007 HC OR 272 NO HCPCS: Performed by: NEUROLOGICAL SURGERY

## 2024-08-05 PROCEDURE — C1765 ADHESION BARRIER: HCPCS | Performed by: NEUROLOGICAL SURGERY

## 2024-08-05 RX ORDER — FENTANYL CITRATE 50 UG/ML
12.5 INJECTION, SOLUTION INTRAMUSCULAR; INTRAVENOUS EVERY 5 MIN PRN
Status: DISCONTINUED | OUTPATIENT
Start: 2024-08-05 | End: 2024-08-05 | Stop reason: HOSPADM

## 2024-08-05 RX ORDER — FENTANYL CITRATE 50 UG/ML
25 INJECTION, SOLUTION INTRAMUSCULAR; INTRAVENOUS EVERY 5 MIN PRN
Status: DISCONTINUED | OUTPATIENT
Start: 2024-08-05 | End: 2024-08-05 | Stop reason: HOSPADM

## 2024-08-05 RX ORDER — ROCURONIUM BROMIDE 10 MG/ML
INJECTION, SOLUTION INTRAVENOUS AS NEEDED
Status: DISCONTINUED | OUTPATIENT
Start: 2024-08-05 | End: 2024-08-05

## 2024-08-05 RX ORDER — CEFAZOLIN 1 G/1
INJECTION, POWDER, FOR SOLUTION INTRAVENOUS AS NEEDED
Status: DISCONTINUED | OUTPATIENT
Start: 2024-08-05 | End: 2024-08-05

## 2024-08-05 RX ORDER — TIMOLOL MALEATE 5 MG/ML
1 SOLUTION/ DROPS OPHTHALMIC DAILY
Qty: 2.5 ML | Refills: 11 | Status: SHIPPED | OUTPATIENT
Start: 2024-08-05

## 2024-08-05 RX ORDER — DROPERIDOL 2.5 MG/ML
0.62 INJECTION, SOLUTION INTRAMUSCULAR; INTRAVENOUS ONCE AS NEEDED
Status: DISCONTINUED | OUTPATIENT
Start: 2024-08-05 | End: 2024-08-05 | Stop reason: HOSPADM

## 2024-08-05 RX ORDER — LIDOCAINE HYDROCHLORIDE 10 MG/ML
0.1 INJECTION, SOLUTION EPIDURAL; INFILTRATION; INTRACAUDAL; PERINEURAL ONCE
Status: DISCONTINUED | OUTPATIENT
Start: 2024-08-05 | End: 2024-08-05 | Stop reason: HOSPADM

## 2024-08-05 RX ORDER — OXYCODONE HYDROCHLORIDE 5 MG/1
5 TABLET ORAL EVERY 6 HOURS PRN
Qty: 28 TABLET | Refills: 0 | Status: SHIPPED | OUTPATIENT
Start: 2024-08-05 | End: 2024-08-12

## 2024-08-05 RX ORDER — SODIUM CHLORIDE, SODIUM LACTATE, POTASSIUM CHLORIDE, CALCIUM CHLORIDE 600; 310; 30; 20 MG/100ML; MG/100ML; MG/100ML; MG/100ML
100 INJECTION, SOLUTION INTRAVENOUS CONTINUOUS
Status: DISCONTINUED | OUTPATIENT
Start: 2024-08-05 | End: 2024-08-05 | Stop reason: HOSPADM

## 2024-08-05 RX ORDER — PHENYLEPHRINE HCL IN 0.9% NACL 1 MG/10 ML
SYRINGE (ML) INTRAVENOUS AS NEEDED
Status: DISCONTINUED | OUTPATIENT
Start: 2024-08-05 | End: 2024-08-05

## 2024-08-05 RX ORDER — PROPOFOL 10 MG/ML
INJECTION, EMULSION INTRAVENOUS AS NEEDED
Status: DISCONTINUED | OUTPATIENT
Start: 2024-08-05 | End: 2024-08-05

## 2024-08-05 RX ORDER — CYCLOBENZAPRINE HCL 10 MG
10 TABLET ORAL EVERY 8 HOURS PRN
Qty: 21 TABLET | Refills: 0 | Status: SHIPPED | OUTPATIENT
Start: 2024-08-05 | End: 2024-08-12

## 2024-08-05 RX ORDER — FENTANYL CITRATE 50 UG/ML
INJECTION, SOLUTION INTRAMUSCULAR; INTRAVENOUS AS NEEDED
Status: DISCONTINUED | OUTPATIENT
Start: 2024-08-05 | End: 2024-08-05

## 2024-08-05 RX ORDER — AMOXICILLIN 250 MG
2 CAPSULE ORAL DAILY
Qty: 14 TABLET | Refills: 0 | Status: SHIPPED | OUTPATIENT
Start: 2024-08-05 | End: 2024-08-12

## 2024-08-05 RX ORDER — ONDANSETRON HYDROCHLORIDE 2 MG/ML
4 INJECTION, SOLUTION INTRAVENOUS ONCE AS NEEDED
Status: DISCONTINUED | OUTPATIENT
Start: 2024-08-05 | End: 2024-08-05 | Stop reason: HOSPADM

## 2024-08-05 RX ORDER — GLYCOPYRROLATE 0.2 MG/ML
INJECTION INTRAMUSCULAR; INTRAVENOUS AS NEEDED
Status: DISCONTINUED | OUTPATIENT
Start: 2024-08-05 | End: 2024-08-05

## 2024-08-05 RX ORDER — METHYLPREDNISOLONE ACETATE 40 MG/ML
INJECTION, SUSPENSION INTRA-ARTICULAR; INTRALESIONAL; INTRAMUSCULAR; SOFT TISSUE AS NEEDED
Status: DISCONTINUED | OUTPATIENT
Start: 2024-08-05 | End: 2024-08-05 | Stop reason: HOSPADM

## 2024-08-05 RX ORDER — FENTANYL CITRATE 50 UG/ML
50 INJECTION, SOLUTION INTRAMUSCULAR; INTRAVENOUS EVERY 5 MIN PRN
Status: DISCONTINUED | OUTPATIENT
Start: 2024-08-05 | End: 2024-08-05 | Stop reason: HOSPADM

## 2024-08-05 RX ORDER — ONDANSETRON HYDROCHLORIDE 2 MG/ML
INJECTION, SOLUTION INTRAVENOUS AS NEEDED
Status: DISCONTINUED | OUTPATIENT
Start: 2024-08-05 | End: 2024-08-05

## 2024-08-05 RX ORDER — LIDOCAINE HYDROCHLORIDE 20 MG/ML
INJECTION, SOLUTION EPIDURAL; INFILTRATION; INTRACAUDAL; PERINEURAL AS NEEDED
Status: DISCONTINUED | OUTPATIENT
Start: 2024-08-05 | End: 2024-08-05

## 2024-08-05 RX ORDER — SODIUM CHLORIDE 0.9 G/100ML
IRRIGANT IRRIGATION AS NEEDED
Status: DISCONTINUED | OUTPATIENT
Start: 2024-08-05 | End: 2024-08-05 | Stop reason: HOSPADM

## 2024-08-05 ASSESSMENT — PAIN SCALES - GENERAL
PAINLEVEL_OUTOF10: 0 - NO PAIN
PAINLEVEL_OUTOF10: 4

## 2024-08-05 ASSESSMENT — PAIN - FUNCTIONAL ASSESSMENT
PAIN_FUNCTIONAL_ASSESSMENT: 0-10

## 2024-08-05 ASSESSMENT — PAIN DESCRIPTION - DESCRIPTORS: DESCRIPTORS: ACHING

## 2024-08-05 ASSESSMENT — COLUMBIA-SUICIDE SEVERITY RATING SCALE - C-SSRS
2. HAVE YOU ACTUALLY HAD ANY THOUGHTS OF KILLING YOURSELF?: NO
6. HAVE YOU EVER DONE ANYTHING, STARTED TO DO ANYTHING, OR PREPARED TO DO ANYTHING TO END YOUR LIFE?: NO
1. IN THE PAST MONTH, HAVE YOU WISHED YOU WERE DEAD OR WISHED YOU COULD GO TO SLEEP AND NOT WAKE UP?: NO

## 2024-08-05 NOTE — BRIEF OP NOTE
Date: 2024  OR Location: [facility] OR    Name: Melita Raya, : 1940, Age: 83 y.o., MRN: [id number], Sex: female    Diagnosis  Pre-op Diagnosis      * Lumbar stenosis with neurogenic claudication [M48.062] Post-op Diagnosis     * Lumbar stenosis with neurogenic claudication [M48.062]     Procedures  L4-L5 Minimally Invasive Lumbar Laminectomy;Medial Facetectomy  06420 - SC GUILLEN FACETECTOMY & FORAMOTOMY 1 VRT SGM LUMBAR    SC MICROSURG TQS REQ USE OPERATING MICROSCOPE [03826]  Surgeons      * Vishal Estrella - Primary    Resident/Fellow/Other Assistant:  Surgeons and Role:     * Chao Villaseñor MD - Resident - Assisting    Procedure Summary  Anesthesia: General  ASA: III  Anesthesia Staff: Anesthesiologist: Brice Tobin MD  CRNA: AVTAR Ralph-CRNA  Estimated Blood Loss: 10mL  Intra-op Medications:   Administrations occurring from 0730 to 1000 on 24:   Medication Name Total Dose   lidocaine-epinephrine PF (Xylocaine W/EPI) 1 %-1:200,000 injection 4 mL   sodium chloride 0.9 % irrigation solution 1,000 mL   methylPREDNISolone acetate (DEPO-Medrol) injection 40 mg              Anesthesia Record               Intraprocedure I/O Totals          Intake    LR bolus 800.00 mL    Total Intake 800 mL       Output    Est. Blood Loss 10 mL    Total Output 10 mL       Net    Net Volume 790 mL          Specimen: No specimens collected     Staff:   Scrub Person: Yaz  Circulator: Jamil Elkins Circulator: Theodore          Findings: good decompression of thecal sac    Complications:  None; patient tolerated the procedure well.     Disposition: PACU - hemodynamically stable.  Condition: stable  Specimens Collected: No specimens collected  Attending Attestation:     Vishal Estrella  Phone Number: [phone]

## 2024-08-05 NOTE — ANESTHESIA POSTPROCEDURE EVALUATION
Patient: Melita Raya    Procedure Summary       Date: 08/05/24 Room / Location: [facility] A OR 06 / [facility] OR    Anesthesia Start: 0720 Anesthesia Stop: 0952    Procedure: L4-L5 Minimally Invasive Lumbar Laminectomy;Medial Facetectomy (Bilateral: Spine Lumbar) Diagnosis:       Lumbar stenosis with neurogenic claudication      (Lumbar stenosis with neurogenic claudication [M48.062])    Surgeons: Vishal Estrella MD Responsible Provider: Brice Tobin MD    Anesthesia Type: general ASA Status: 3            Anesthesia Type: general    Vitals Value Taken Time   /79 08/05/24 1029   Temp 36 °C (96.8 °F) 08/05/24 0945   Pulse 54 08/05/24 1028   Resp 18 08/05/24 1029   SpO2 96 % 08/05/24 1028   Vitals shown include unfiled device data.    Anesthesia Post Evaluation    Patient participation: complete - patient cannot participate  Level of consciousness: awake  Pain management: adequate  Airway patency: patent  Cardiovascular status: acceptable and stable  Respiratory status: acceptable and nasal cannula  Hydration status: acceptable  Postoperative Nausea and Vomiting: none        No notable events documented.

## 2024-08-05 NOTE — PROGRESS NOTES
Physical Therapy                 Therapy Communication Note    Patient Name: Melita Raya  MRN: [id number]  Today's Date: 8/5/2024     Discipline: Physical Therapy    Missed Visit Reason: Missed Visit Reason:  (Per RN, pt has been up ambulating independently with no acute PT needs. PT orders will be discharged.)    Missed Time: Screen    Comment:

## 2024-08-05 NOTE — ANESTHESIA PREPROCEDURE EVALUATION
Patient: Melita Raya    Procedure Information       Anesthesia Start Date/Time: 08/05/24 0720    Procedure: L4-L5 Minimally Invasive Lumbar Laminectomy;Medial Facetectomy (Bilateral: Spine Lumbar)    Location: [facility] A OR 06 / [facility] A OR    Surgeons: Vishal Estrella MD            Relevant Problems   Cardiac   (+) Aortic valve sclerosis   (+) Arteriosclerotic coronary artery disease   (+) Hyperlipidemia   (+) Hypertension   (+) Sinus bradycardia   (+) Thoracic back pain      Pulmonary   (+) Dyspnea on exertion      Neuro   (+) Lumbar radiculopathy      Liver   (+) Cholelithiasis      Musculoskeletal   (+) Lumbar stenosis with neurogenic claudication   (+) Osteoarthritis of knee      HEENT   (+) Chronic open angle glaucoma   (+) Chronic primary angle-closure glaucoma of both eyes, severe stage   (+) Chronic primary angle-closure glaucoma of left eye   (+) Glaucoma   (+) Primary open angle glaucoma of left eye, severe stage   (+) Primary open angle glaucoma of right eye, severe stage       Clinical information reviewed:   Tobacco  Allergies  Meds   Med Hx  Surg Hx   Fam Hx  Soc Hx        NPO Detail:  NPO/Void Status  Carbohydrate Drink Given Prior to Surgery? : N  Date of Last Liquid: 08/05/24  Time of Last Liquid: 0500  Date of Last Solid: 08/04/24  Time of Last Solid: 1830  Last Intake Type: Clear fluids  Time of Last Void: 0634         Physical Exam    Airway  Mallampati: II  TM distance: >3 FB  Neck ROM: full     Cardiovascular    Dental    Pulmonary    Abdominal            Anesthesia Plan    History of general anesthesia?: yes  History of complications of general anesthesia?: no    ASA 3     general   (GA, prone  )  intravenous induction   Anesthetic plan and risks discussed with patient.    Plan discussed with CRNA.

## 2024-08-05 NOTE — OP NOTE
L4-L5 Minimally Invasive Lumbar Laminectomy;Medial Facetectomy (B) Operative Note     Date: 2024  OR Location: [facility] A OR    Name: Melita Raya : 1940, Age: 83 y.o., MRN: [id number], Sex: female    Diagnosis  Pre-op Diagnosis      * Lumbar stenosis with neurogenic claudication [M48.062] Post-op Diagnosis     * Lumbar stenosis with neurogenic claudication [M48.062]     Procedures  L4-L5 Minimally Invasive Lumbar Laminectomy;Medial Facetectomy  45326 - MS GUILLEN FACETECTOMY & FORAMOTOMY 1 VRT SGM LUMBAR    MS MICROSURG TQS REQ USE OPERATING MICROSCOPE [38915]  Surgeons      * Vishal Estrella - Primary    Resident/Fellow/Other Assistant:  Surgeons and Role:     * Chao Villaseñor MD - Resident - Assisting    MEDICATIONS:    Antibiotic prophylaxis given within one hour prior to skin incision.    PROPHYLAXIS:    Venous thromboembolism prophylaxis was ordered at the time of surgery in the form of mechanical prophylaxis using sequential compression devices.    INDICATION:  Ms. Raya is a really nice 83 y.o. female who presented with intractable low back and lower extremity symptoms with good correlation between the imaging studies and clinical signs and symptoms.  The patient failed all conservative treatment. In view of the presence of significant symptoms affecting the activities of daily living to a significant extent,  surgical treatment was discussed with the goals, risk and benefits of surgery and the fact that surgery may or may not alleviate the symptoms completely with small chances of even worsening of the symptoms.  The patient chose to proceed with surgery after clear understanding of all the risk and benefits and goals of surgery.      OPERATION/PROCEDURE:    L4-5 laminectomy, medial facetectomy, and foraminotomy  Use of operative microscope.     TECHNIQUE:    After induction of general anesthesia, the patient was carefully turned prone on a Isaiah frame on a Billy table.  All dependent  portions were carefully padded.  Lumbar part of the patient's back was scrubbed, prepped, and draped in the usual sterile fashion. L4-5  level was identified on the lateral fluoroscopy and correlated with the preoperative imaging.  An AP x-ray was also obtained to ensure there was no rotation of the spine. A paramedian incision was then at L4/5 on the the left.  A 20 mm  diameter retractor tube was then docked at L4-5 level with the help of intraoperative fluoroscopy.   The operative microscope was brought into the field for remainder of the case for microdissection.    Hemilaminectomy, facetectomy, and foraminotomy was performed using high-speed drill and Kerrison rongeurs.  This exposed the ligamentum flavum across the segment on this side. The tube was then angled towards the contralateral side.  The ligamentum flavum was then dissected down off the bone, and internal laminectomy completed by drilling out the base of the spinous process, contralateral lamina and contralateral medial facet joint.  Ligamentum flavum and hypertrophic synovial tissue were then dissected free of the dura completely across the entire segment bilaterally, and then the contralateral at first and then ipsilateral lateral recess decompressed using Kerrison rongeurs.  Angled micro dissectors were used to confirm good decompression of the neural elements across the entire segment.  The wound was copiously irrigated with antibiotic irrigation.  Hemostasis was achieved. A small amount of Depo-Medrol was laid over the nerve and central dura at both the levels and the tubes were removed cauterizing muscle bleeders on the way out.  Marcaine local anesthesia was injected into the paraspinal muscles, and the wounds were closed in layers using interrupted 0 Vicryl for the fascia, 2-0 Vicryl for the subcutaneous layer and Dermabond glue for the skin.  The drapes were removed.  Sterile dressing was applied.  The patient was turned back supine,  awakened, was taken to the recovery room.  The EBL was 10 ml         Complications:  None; patient tolerated the procedure well.    Disposition: PACU - hemodynamically stable.  Condition: stable

## 2024-08-05 NOTE — ANESTHESIA PROCEDURE NOTES
Airway  Date/Time: 8/5/2024 7:50 AM  Urgency: elective    Airway not difficult    Staffing  Performed: CRNA   Authorized by: Brice Tobin MD    Performed by: AVTAR Ralph-ROSALINA  Patient location during procedure: OR    Indications and Patient Condition  Indications for airway management: anesthesia and airway protection  Spontaneous Ventilation: absent  Sedation level: deep  Preoxygenated: yes  Patient position: sniffing  MILS maintained throughout  Mask difficulty assessment: 1 - vent by mask    Final Airway Details  Final airway type: endotracheal airway      Successful airway: ETT  Cuffed: yes   Successful intubation technique: direct laryngoscopy  Endotracheal tube insertion site: oral  Blade: Kayla  Blade size: #3  ETT size (mm): 7.0  Cormack-Lehane Classification: grade I - full view of glottis  Placement verified by: chest auscultation and capnometry   Measured from: lips  ETT to lips (cm): 20  Number of attempts at approach: 1  Number of other approaches attempted: 0

## 2024-08-05 NOTE — PERIOPERATIVE NURSING NOTE
1035: Patient ambulated greater than 15 feet with a steady gait. Assistive devices used: None. 1 person standby assist No additional safety concerns. Notified assigned PT/OT.

## 2024-08-06 ASSESSMENT — PAIN SCALES - GENERAL: PAINLEVEL_OUTOF10: 2

## 2024-08-12 DIAGNOSIS — I10 PRIMARY HYPERTENSION: ICD-10-CM

## 2024-08-12 RX ORDER — NIFEDIPINE 30 MG/1
30 TABLET, FILM COATED, EXTENDED RELEASE ORAL DAILY
Qty: 90 TABLET | Refills: 1 | Status: SHIPPED | OUTPATIENT
Start: 2024-08-12

## 2024-08-13 ENCOUNTER — TELEPHONE (OUTPATIENT)
Dept: NEUROSURGERY | Facility: HOSPITAL | Age: 84
End: 2024-08-13
Payer: MEDICARE

## 2024-08-13 NOTE — TELEPHONE ENCOUNTER
Talked to pt and she said that her incision is with out redness, swelling or drainage. She said she is doing fine and I discussed wound care and activity. She will see Dr. Estrella on 9/10.

## 2024-08-14 ENCOUNTER — APPOINTMENT (OUTPATIENT)
Dept: NEUROSURGERY | Facility: HOSPITAL | Age: 84
End: 2024-08-14
Payer: MEDICARE

## 2024-09-10 ENCOUNTER — OFFICE VISIT (OUTPATIENT)
Dept: NEUROSURGERY | Facility: CLINIC | Age: 84
End: 2024-09-10
Payer: MEDICARE

## 2024-09-10 VITALS
DIASTOLIC BLOOD PRESSURE: 71 MMHG | SYSTOLIC BLOOD PRESSURE: 153 MMHG | TEMPERATURE: 97.3 F | HEIGHT: 64 IN | HEART RATE: 56 BPM | WEIGHT: 172 LBS | BODY MASS INDEX: 29.37 KG/M2

## 2024-09-10 DIAGNOSIS — Z09 POSTOPERATIVE FOLLOW-UP: Primary | ICD-10-CM

## 2024-09-10 PROCEDURE — 1126F AMNT PAIN NOTED NONE PRSNT: CPT | Performed by: NEUROLOGICAL SURGERY

## 2024-09-10 PROCEDURE — 3078F DIAST BP <80 MM HG: CPT | Performed by: NEUROLOGICAL SURGERY

## 2024-09-10 PROCEDURE — 3077F SYST BP >= 140 MM HG: CPT | Performed by: NEUROLOGICAL SURGERY

## 2024-09-10 PROCEDURE — 1036F TOBACCO NON-USER: CPT | Performed by: NEUROLOGICAL SURGERY

## 2024-09-10 PROCEDURE — 1157F ADVNC CARE PLAN IN RCRD: CPT | Performed by: NEUROLOGICAL SURGERY

## 2024-09-10 PROCEDURE — 99211 OFF/OP EST MAY X REQ PHY/QHP: CPT | Performed by: NEUROLOGICAL SURGERY

## 2024-09-10 PROCEDURE — 1159F MED LIST DOCD IN RCRD: CPT | Performed by: NEUROLOGICAL SURGERY

## 2024-09-10 ASSESSMENT — PAIN SCALES - GENERAL: PAINLEVEL: 0-NO PAIN

## 2024-09-10 NOTE — PROGRESS NOTES
I just had the pleasure of seeing Mr. Raya back in the Neurosurgery Spine Clinic at the [facility]. He is a very pleasant 83 -year-old male, who recently underwent a L4-5 Minimally invasive laminectomy with me on 8/5/2024 and is status post 6 weeks out from his surgery.    Ms. Raya is doing well from her surgery.  She is extremely happy from how she is doing after surgery and feels that all her preoperative symptoms are gone and she does not have any complaints whatsoever at this point.    Alert and oriented  No apparent distress.  Motor strength baseline with no new weakness.   Sensory exam grossly intact  Gait Normal  Incision well healed.    The patient has been doing very well with no neurosurgical concerns. Considering that, Ms. Raya does not need to follow up with us on a routine basis anymore and should see us only as necessary especially in case she develops new onset worsening pain or any new neurological symptoms such as numbness, tingling, bowel bladder symptoms or weakness involving any of the extremities.     It was a pleasure to participate in [facility] care.     Vishal Estrella MD, [facility]   of Neurological Surgery  [facility] [employer]  Attending Surgeon  Director - Minimally Invasive Spine Surgery  [place], [place]    ---Some of this note was completed using Dragon voice recognition technology and sometimes the software misinterprets words. This may include unintended errors with respect to translation of words, typographical errors or grammar errors which may not have been identified prior to finalization of the chart note. Please take this into account when reading this note---

## 2024-09-20 DIAGNOSIS — E78.5 HYPERLIPIDEMIA, UNSPECIFIED HYPERLIPIDEMIA TYPE: ICD-10-CM

## 2024-09-23 RX ORDER — ATORVASTATIN CALCIUM 80 MG/1
80 TABLET, FILM COATED ORAL DAILY
Qty: 90 TABLET | Refills: 3 | Status: SHIPPED | OUTPATIENT
Start: 2024-09-23

## 2024-10-14 ENCOUNTER — DOCUMENTATION (OUTPATIENT)
Dept: PHYSICAL THERAPY | Facility: CLINIC | Age: 84
End: 2024-10-14
Payer: MEDICARE

## 2024-10-14 NOTE — PROGRESS NOTES
Physical Therapy    Discharge Summary    Name: Melita Raya  MRN: [id number]  : 1940  Date: 10/14/24    Discharge Summary: PT    Discharge Information: Date of discharge 10/14/24, Date of last visit 10/9/23, Date of evaluation 23, and Number of attended visits 8    Therapy Summary: see last PT progress note    Discharge Status: unknown     Rehab Discharge Reason: Failed to schedule and/or keep follow-up appointment(s)

## 2024-10-17 DIAGNOSIS — J30.89 ENVIRONMENTAL AND SEASONAL ALLERGIES: ICD-10-CM

## 2024-10-18 RX ORDER — FLUTICASONE PROPIONATE 50 MCG
1 SPRAY, SUSPENSION (ML) NASAL DAILY
Qty: 16 G | Refills: 3 | Status: SHIPPED | OUTPATIENT
Start: 2024-10-18

## 2024-10-22 ENCOUNTER — APPOINTMENT (OUTPATIENT)
Dept: PRIMARY CARE | Facility: CLINIC | Age: 84
End: 2024-10-22
Payer: MEDICARE

## 2024-10-22 VITALS
DIASTOLIC BLOOD PRESSURE: 70 MMHG | HEART RATE: 50 BPM | WEIGHT: 173 LBS | HEIGHT: 64 IN | SYSTOLIC BLOOD PRESSURE: 130 MMHG | BODY MASS INDEX: 29.53 KG/M2

## 2024-10-22 DIAGNOSIS — Z00.00 MEDICARE ANNUAL WELLNESS VISIT, SUBSEQUENT: Primary | ICD-10-CM

## 2024-10-22 DIAGNOSIS — N18.31 CHRONIC KIDNEY DISEASE, STAGE 3A (MULTI): ICD-10-CM

## 2024-10-22 DIAGNOSIS — I10 PRIMARY HYPERTENSION: ICD-10-CM

## 2024-10-22 DIAGNOSIS — Z12.83 SKIN EXAM, SCREENING FOR CANCER: ICD-10-CM

## 2024-10-22 DIAGNOSIS — Z23 NEED FOR INFLUENZA VACCINATION: ICD-10-CM

## 2024-10-22 PROCEDURE — 1036F TOBACCO NON-USER: CPT | Performed by: STUDENT IN AN ORGANIZED HEALTH CARE EDUCATION/TRAINING PROGRAM

## 2024-10-22 PROCEDURE — 90662 IIV NO PRSV INCREASED AG IM: CPT | Performed by: STUDENT IN AN ORGANIZED HEALTH CARE EDUCATION/TRAINING PROGRAM

## 2024-10-22 PROCEDURE — 1170F FXNL STATUS ASSESSED: CPT | Performed by: STUDENT IN AN ORGANIZED HEALTH CARE EDUCATION/TRAINING PROGRAM

## 2024-10-22 PROCEDURE — 3075F SYST BP GE 130 - 139MM HG: CPT | Performed by: STUDENT IN AN ORGANIZED HEALTH CARE EDUCATION/TRAINING PROGRAM

## 2024-10-22 PROCEDURE — 1160F RVW MEDS BY RX/DR IN RCRD: CPT | Performed by: STUDENT IN AN ORGANIZED HEALTH CARE EDUCATION/TRAINING PROGRAM

## 2024-10-22 PROCEDURE — 1124F ACP DISCUSS-NO DSCNMKR DOCD: CPT | Performed by: STUDENT IN AN ORGANIZED HEALTH CARE EDUCATION/TRAINING PROGRAM

## 2024-10-22 PROCEDURE — 1157F ADVNC CARE PLAN IN RCRD: CPT | Performed by: STUDENT IN AN ORGANIZED HEALTH CARE EDUCATION/TRAINING PROGRAM

## 2024-10-22 PROCEDURE — 99214 OFFICE O/P EST MOD 30 MIN: CPT | Performed by: STUDENT IN AN ORGANIZED HEALTH CARE EDUCATION/TRAINING PROGRAM

## 2024-10-22 PROCEDURE — G0008 ADMIN INFLUENZA VIRUS VAC: HCPCS | Performed by: STUDENT IN AN ORGANIZED HEALTH CARE EDUCATION/TRAINING PROGRAM

## 2024-10-22 PROCEDURE — 1159F MED LIST DOCD IN RCRD: CPT | Performed by: STUDENT IN AN ORGANIZED HEALTH CARE EDUCATION/TRAINING PROGRAM

## 2024-10-22 PROCEDURE — G0439 PPPS, SUBSEQ VISIT: HCPCS | Performed by: STUDENT IN AN ORGANIZED HEALTH CARE EDUCATION/TRAINING PROGRAM

## 2024-10-22 PROCEDURE — 3078F DIAST BP <80 MM HG: CPT | Performed by: STUDENT IN AN ORGANIZED HEALTH CARE EDUCATION/TRAINING PROGRAM

## 2024-10-22 ASSESSMENT — PATIENT HEALTH QUESTIONNAIRE - PHQ9
SUM OF ALL RESPONSES TO PHQ9 QUESTIONS 1 AND 2: 0
1. LITTLE INTEREST OR PLEASURE IN DOING THINGS: NOT AT ALL
2. FEELING DOWN, DEPRESSED OR HOPELESS: NOT AT ALL

## 2024-10-22 ASSESSMENT — ACTIVITIES OF DAILY LIVING (ADL)
DRESSING: INDEPENDENT
TAKING_MEDICATION: INDEPENDENT
MANAGING_FINANCES: INDEPENDENT
BATHING: INDEPENDENT
DOING_HOUSEWORK: INDEPENDENT
GROCERY_SHOPPING: INDEPENDENT

## 2024-10-22 NOTE — PROGRESS NOTES
Subjective   Reason for Visit: Melita Raya is an 84 y.o. female here for a Medicare Wellness visit.     Past Medical, Surgical, and Family History reviewed and updated in chart.    Reviewed all medications by prescribing practitioner or clinical pharmacist (such as prescriptions, OTCs, herbal therapies and supplements) and documented in the medical record.    HPI  85yo female with HTN, aortic valve stenosis, cholelithiasis, chronic open angle glaucoma, bilateral knee replacement, hyperlipidemia, and seasonal allergic rhinitis/sinusitis             Patient Care Team:  Yoly Valente MD as PCP - General (Family Medicine)  Yoly Valente MD as PCP - [employer]age PCP  Negro Hickman MD as Cardiologist (Cardiology)  Andressa Vinson RN as Nurse Navigator (Spine Surgery)     Current Outpatient Medications   Medication Instructions    acetaminophen (TYLENOL 8 HOUR) 650 mg, Every 8 hours PRN    aspirin 81 mg EC tablet 1 tablet, Daily    atenolol (TENORMIN) 25 mg, oral, Daily    atorvastatin (LIPITOR) 80 mg, oral, Daily    cetirizine (ZYRTEC) 10 mg, oral, Every morning    cholecalciferol (VITAMIN D-3) 50 mcg, Daily    cyclobenzaprine (FLEXERIL) 10 mg, oral, Every 8 hours PRN    ezetimibe (ZETIA) 10 mg, oral, Daily    fluticasone (Flonase) 50 mcg/actuation nasal spray 1 spray, Each Nostril, Daily    hydroCHLOROthiazide (HYDRODIURIL) 25 mg, oral, Daily    latanoprost (Xalatan) 0.005 % ophthalmic solution 1 drop, Both Eyes, Nightly    losartan (COZAAR) 50 mg, oral, Daily    NIFEdipine ER (ADALAT CC) 30 mg, oral, Daily    timolol (Timoptic) 0.5 % ophthalmic solution 1 drop, Both Eyes, Daily, Instill one gtt o.u. qam        Social History     Tobacco Use    Smoking status: Never    Smokeless tobacco: Never   Substance Use Topics    Alcohol use: Yes     Alcohol/week: 4.0 standard drinks of alcohol     Types: 4 Glasses of wine per week        Review of Systems  Constitutional: no chills, no fever  "and no night sweats.     Eyes: no blurred vision and no eyesight problems.     ENT: no hearing loss, no nasal congestion, no nasal discharge, no hoarseness and no sore throat.     Cardiovascular: no chest pain, no intermittent leg claudication, no lower extremity edema, no palpitations and no syncope.     Respiratory: no cough, no shortness of breath during exertion, no shortness of breath at rest and no wheezing.     Gastrointestinal: no abdominal pain, no blood in stools, no constipation, no diarrhea, no melena, no nausea, no rectal pain and no vomiting.     Genitourinary: no dysuria, no change in urinary frequency, no urinary hesitancy, no feelings of urinary urgency and no vaginal discharge.     Musculoskeletal: no arthralgias, no back pain and no myalgias.     Integumentary: no new skin lesions and no rashes.     Neurological: no difficulty walking, no headache, no limb weakness, no numbness and no tingling.     Psychiatric: no anxiety, no depression, no anhedonia and no substance use disorders.     Endocrine: no recent weight gain and no recent weight loss.     Hematologic/Lymphatic: no tendency for easy bruising and no swollen glands.          All other systems have been reviewed and are negative for complaint.    Objective   Vitals:  /70   Pulse 50   Ht 1.626 m (5' 4\")   Wt 78.5 kg (173 lb)   BMI 29.70 kg/m²       Physical Exam    Constitutional: Alert and in no acute distress. Well developed, well nourished.     Eyes: Normal external exam. Pupils were equal in size, round, reactive to light (PERRL) with normal accommodation and extraocular movements intact (EOMI).     Ears, Nose, Mouth, and Throat: External inspection of ears and nose: Normal.  Otoscopic examination: Normal.      Neck: No neck mass was observed. Supple.     Cardiovascular:  bradycardia,TALI+ no gallops, no murmurs and no pericardial rub    Pulmonary: No respiratory distress. Clear bilateral breath sounds.     Abdomen: Soft " nontender; no abdominal mass palpated. No organomegaly.     Musculoskeletal: No joint swelling seen, normal movements of all extremities. Range of motion: Normal.  Muscle strength/tone: Normal.          Neurologic: Deep tendon reflexes were 2+ and symmetric. Sensation: Normal.     Psychiatric: Judgment and insight: Intact. Mood and affect: Normal.      Assessment/Plan   Problem List Items Addressed This Visit       Hypertension    Chronic kidney disease, stage 3a (Multi)    Relevant Orders    Basic Metabolic Panel     Other Visit Diagnoses       Medicare annual wellness visit, subsequent    -  Primary    Need for influenza vaccination        Relevant Orders    Flu vaccine, trivalent, preservative free, HIGH-DOSE, age 65y+ (Fluzone) (Completed)    Skin exam, screening for cancer        Relevant Orders    Referral to Dermatology             84yo female with HTN, aortic valve stenosis, cholelithiasis, chronic open angle glaucoma, bilateral knee replacement, hyperlipidemia, and seasonal allergic rhinitis/sinusitis , Lumbar spine surgery in August 2024     MMSE 28/30 today   No anemia noted on labs   Fu with derm for skin / mole concerns   Nifedipine can cause peripheral edema , ankle edema  is  intermittent     Htn: Rpt BP at goal       Immunizations : UTD  Influenza :   Prevnar 20 :    Pneumovax 23:   Shingles:      Cancer screenings:  none indicated at this age      Osteoporosis :   osteopenia  in 2023         RTO in 6m or sooner if needed     This note is intended for the physician writing it, as well as to communicate findings to other healthcare professionals. These notes use medical lexicon that may be misunderstood by non medical persons. Therefore, interpretations of medical notes and terminology should be approached with caution.

## 2024-11-25 ENCOUNTER — APPOINTMENT (OUTPATIENT)
Dept: OPHTHALMOLOGY | Facility: CLINIC | Age: 84
End: 2024-11-25
Payer: MEDICARE

## 2024-11-27 DIAGNOSIS — I10 PRIMARY HYPERTENSION: ICD-10-CM

## 2024-11-27 RX ORDER — LOSARTAN POTASSIUM 50 MG/1
50 TABLET ORAL DAILY
Qty: 90 TABLET | Refills: 1 | Status: SHIPPED | OUTPATIENT
Start: 2024-11-27

## 2024-12-31 ENCOUNTER — APPOINTMENT (OUTPATIENT)
Dept: OPHTHALMOLOGY | Facility: CLINIC | Age: 84
End: 2024-12-31
Payer: MEDICARE

## 2024-12-31 DIAGNOSIS — H40.1132 PRIMARY OPEN ANGLE GLAUCOMA (POAG) OF BOTH EYES, MODERATE STAGE: ICD-10-CM

## 2024-12-31 PROCEDURE — 99214 OFFICE O/P EST MOD 30 MIN: CPT | Performed by: OPHTHALMOLOGY

## 2024-12-31 RX ORDER — TIMOLOL MALEATE 5 MG/ML
1 SOLUTION/ DROPS OPHTHALMIC DAILY
Qty: 2.5 ML | Refills: 3 | Status: SHIPPED | OUTPATIENT
Start: 2024-12-31 | End: 2025-03-31

## 2024-12-31 RX ORDER — LATANOPROST 50 UG/ML
1 SOLUTION/ DROPS OPHTHALMIC NIGHTLY
Qty: 2.5 ML | Refills: 3 | Status: SHIPPED | OUTPATIENT
Start: 2024-12-31 | End: 2025-03-31

## 2024-12-31 ASSESSMENT — TONOMETRY
OD_IOP_MMHG: 14
IOP_METHOD: GOLDMANN APPLANATION
OS_IOP_MMHG: 14

## 2024-12-31 ASSESSMENT — EXTERNAL EXAM - LEFT EYE: OS_EXAM: NORMAL

## 2024-12-31 ASSESSMENT — PACHYMETRY
OS_CT(UM): 539
OD_CT(UM): 543

## 2024-12-31 ASSESSMENT — VISUAL ACUITY
OD_PH_CC: 20/25-1
OS_CC: 20/25+2
METHOD: SNELLEN - LINEAR
OD_CC: 20/40-1
CORRECTION_TYPE: GLASSES

## 2024-12-31 ASSESSMENT — CUP TO DISC RATIO
OD_RATIO: .8
OS_RATIO: .75

## 2024-12-31 ASSESSMENT — GONIOSCOPY
OS_SUPERIOR: 3/360
OD_SUPERIOR: 3/360

## 2024-12-31 ASSESSMENT — SLIT LAMP EXAM - LIDS
COMMENTS: GOOD POSITION
COMMENTS: GOOD POSITION

## 2024-12-31 ASSESSMENT — EXTERNAL EXAM - RIGHT EYE: OD_EXAM: NORMAL

## 2025-01-16 ENCOUNTER — APPOINTMENT (OUTPATIENT)
Dept: CARDIOLOGY | Facility: CLINIC | Age: 85
End: 2025-01-16
Payer: MEDICARE

## 2025-01-16 VITALS
DIASTOLIC BLOOD PRESSURE: 68 MMHG | BODY MASS INDEX: 30 KG/M2 | OXYGEN SATURATION: 96 % | HEART RATE: 61 BPM | WEIGHT: 175.7 LBS | SYSTOLIC BLOOD PRESSURE: 148 MMHG | HEIGHT: 64 IN

## 2025-01-16 DIAGNOSIS — R93.1 AGATSTON CORONARY ARTERY CALCIUM SCORE BETWEEN 200 AND 399: ICD-10-CM

## 2025-01-16 DIAGNOSIS — I10 PRIMARY HYPERTENSION: ICD-10-CM

## 2025-01-16 DIAGNOSIS — E78.2 MIXED HYPERLIPIDEMIA: ICD-10-CM

## 2025-01-16 DIAGNOSIS — I25.10 ARTERIOSCLEROTIC CORONARY ARTERY DISEASE: Primary | ICD-10-CM

## 2025-01-16 PROCEDURE — 93005 ELECTROCARDIOGRAM TRACING: CPT | Performed by: INTERNAL MEDICINE

## 2025-01-16 PROCEDURE — 1036F TOBACCO NON-USER: CPT | Performed by: INTERNAL MEDICINE

## 2025-01-16 PROCEDURE — 99213 OFFICE O/P EST LOW 20 MIN: CPT | Performed by: INTERNAL MEDICINE

## 2025-01-16 PROCEDURE — 1160F RVW MEDS BY RX/DR IN RCRD: CPT | Performed by: INTERNAL MEDICINE

## 2025-01-16 PROCEDURE — 3078F DIAST BP <80 MM HG: CPT | Performed by: INTERNAL MEDICINE

## 2025-01-16 PROCEDURE — 99213 OFFICE O/P EST LOW 20 MIN: CPT | Mod: 25 | Performed by: INTERNAL MEDICINE

## 2025-01-16 PROCEDURE — 1159F MED LIST DOCD IN RCRD: CPT | Performed by: INTERNAL MEDICINE

## 2025-01-16 PROCEDURE — 1126F AMNT PAIN NOTED NONE PRSNT: CPT | Performed by: INTERNAL MEDICINE

## 2025-01-16 PROCEDURE — 93010 ELECTROCARDIOGRAM REPORT: CPT | Performed by: INTERNAL MEDICINE

## 2025-01-16 PROCEDURE — G2211 COMPLEX E/M VISIT ADD ON: HCPCS | Performed by: INTERNAL MEDICINE

## 2025-01-16 PROCEDURE — 1157F ADVNC CARE PLAN IN RCRD: CPT | Performed by: INTERNAL MEDICINE

## 2025-01-16 PROCEDURE — 3077F SYST BP >= 140 MM HG: CPT | Performed by: INTERNAL MEDICINE

## 2025-01-16 RX ORDER — HYDROCHLOROTHIAZIDE 25 MG/1
25 TABLET ORAL DAILY
Qty: 90 TABLET | Refills: 3 | Status: SHIPPED | OUTPATIENT
Start: 2025-01-16

## 2025-01-16 RX ORDER — NIFEDIPINE 30 MG/1
30 TABLET, FILM COATED, EXTENDED RELEASE ORAL DAILY
Qty: 90 TABLET | Refills: 1 | Status: SHIPPED | OUTPATIENT
Start: 2025-01-16

## 2025-01-16 RX ORDER — ATENOLOL 25 MG/1
25 TABLET ORAL DAILY
Qty: 90 TABLET | Refills: 3 | Status: SHIPPED | OUTPATIENT
Start: 2025-01-16

## 2025-01-16 RX ORDER — LOSARTAN POTASSIUM 50 MG/1
50 TABLET ORAL DAILY
Qty: 90 TABLET | Refills: 1 | Status: SHIPPED | OUTPATIENT
Start: 2025-01-16

## 2025-01-16 ASSESSMENT — ENCOUNTER SYMPTOMS
LOSS OF SENSATION IN FEET: 0
OCCASIONAL FEELINGS OF UNSTEADINESS: 0
DEPRESSION: 0

## 2025-01-16 ASSESSMENT — PAIN SCALES - GENERAL: PAINLEVEL_OUTOF10: 0-NO PAIN

## 2025-01-16 NOTE — PROGRESS NOTES
Subjective   Melita Raya is a 84 y.o. female who presents to the [facility] for follow up of dyslipidemia, hypertension and aortic valve sclerosis. Last seen in 2024.     Since last visit, Ms. Raya has stable cardiac status with CHF symptoms (PND, orthopnea), chest pain, palpitations, claudication, or syncope.     She has L4-L5 spinal surgery in 2024 since our prior visit.     She notes less exertional dyspnea after completing recovery and physical therapy from back surgery.     Now home BP trend was 120s-130s/70s mm Hg on prior log book but has not been checking in the last 3 months.     2018 CT calcium score of 381. ROUSE 10 year risk score of 21% for MI based on risk factors at that time. We increased atorvastatin to 80 mg a day in 2019. Blood pressure at goal on current medications. Her ankle edema has resolved since starting HCTZ 25 mg a day.  has stable exertional dyspnea with climbing stairs. Has done less walking since February due to bereavement from her daughter's death.      Normal LV function on echo 2018 with stable mild aortic sclerosis without stenosis.     Past Medical History:  1. Coronary arteriosclerosis: 2018 CT calcium score of 381. ROUSE 10 year risk score of 21%  2. Hypertension  3. Dyslipidemia: 2019 lipids (atorvastatin 80 mg):  / TG 84 / HDL 71 / . Prior use of ezetimibe did not lower LDL level. Prior use of simvastatin 40 mg and 80 mg caused increase in LFTs.   4. Osteoarthritis     Past Surgical History:  1. Right knee replacement      Social History:  Quit tobacco 40 years ago. Retired from  at [employer].     Family History:  Mother and 2 sisters with CAD prior to 64 yo. Mother  at 63 yo of MI. Sister had stent 7 years ago.     Review of Systems    A 14 point review of systems was asked. All questions were negative except for pertinent positives listed in the  HPI.     Current Outpatient Medications on File Prior to Visit   Medication Sig Dispense Refill    acetaminophen (Tylenol 8 HOUR) 650 mg ER tablet Take 1 tablet (650 mg) by mouth every 8 hours if needed for mild pain (1 - 3). Do not crush, chew, or split.      aspirin 81 mg EC tablet Take 1 tablet (81 mg) by mouth once daily.      atenolol (Tenormin) 25 mg tablet Take 1 tablet (25 mg) by mouth once daily. 90 tablet 3    atorvastatin (Lipitor) 80 mg tablet TAKE 1 TABLET(80 MG) BY MOUTH EVERY DAY 90 tablet 3    cetirizine (ZyrTEC) 10 mg tablet Take 1 tablet (10 mg) by mouth once daily in the morning. 90 tablet 3    cholecalciferol (Vitamin D-3) 50 MCG (2000 UT) tablet Take 1 tablet (50 mcg) by mouth once daily.      ezetimibe (Zetia) 10 mg tablet Take 1 tablet (10 mg) by mouth once daily. 90 tablet 3    fluticasone (Flonase) 50 mcg/actuation nasal spray Administer 1 spray into each nostril once daily. 16 g 3    hydroCHLOROthiazide (HYDRODiuril) 25 mg tablet Take 1 tablet (25 mg) by mouth once daily. 90 tablet 3    latanoprost (Xalatan) 0.005 % ophthalmic solution Administer 1 drop into both eyes once daily at bedtime. 2.5 mL 3    losartan (Cozaar) 50 mg tablet Take 1 tablet (50 mg) by mouth once daily. 90 tablet 1    NIFEdipine ER (Adalat CC) 30 mg 24 hr tablet Take 1 tablet (30 mg) by mouth once daily. 90 tablet 1    timolol (Timoptic) 0.5 % ophthalmic solution Administer 1 drop into both eyes once daily. Instill one gtt o.u. qam 2.5 mL 3    [DISCONTINUED] cyclobenzaprine (Flexeril) 10 mg tablet Take 1 tablet (10 mg) by mouth every 8 hours if needed for muscle spasms for up to 7 days. 21 tablet 0     No current facility-administered medications on file prior to visit.      Objective   Physical Exam  BP Readings from Last 3 Encounters:   01/16/25 148/68   10/22/24 130/70   09/10/24 153/71      Wt Readings from Last 3 Encounters:   01/16/25 79.7 kg (175 lb 11.2 oz)   10/22/24 78.5 kg (173 lb)   09/10/24 78 kg (172 lb)  "     BMI: Estimated body mass index is 30.16 kg/m² as calculated from the following:    Height as of this encounter: 1.626 m (5' 4\").    Weight as of this encounter: 79.7 kg (175 lb 11.2 oz).  BSA: Estimated body surface area is 1.9 meters squared as calculated from the following:    Height as of this encounter: 1.626 m (5' 4\").    Weight as of this encounter: 79.7 kg (175 lb 11.2 oz).    General: no acute distress  HEENT: EOMI, no scleral icterus.  Lungs: Clear to auscultation bilaterally without wheezing, rales, or rhonchi.  Cardiovascular: Regular rhythm and rate. Normal S1 and S2. No murmurs, rubs, or gallops are appreciated. JVP normal.  Abdomen: Soft, nontender, nondistended. Bowel sounds present.  Extremities: Warm and well perfused with equal 2+ pulses bilaterally.  No edema present.  Neurologic: Alert and oriented x3.    I have personally reviewed the following images and laboratory findings:  Last echocardiogram:   Most recent echo, 1/10/2018: LV EF 60-65%, impaired relaxation diastology (E/e' 15), normal LA size (MARTIN 21), normal RV/RA, aortic valve sclerosis, trivial AI, mild MAC, trace MR, trace TR. RVSP not estimated (RA pressure ~ 3 mm Hg with normal IVC size and respiratory collapse). No changes from 2014    Last cath / stress test / CACS:   2018 CT calcium score of 381. ROUSE 10 year risk score of 21%    Most recent EC2024 ECG: Sinus rhythm, 50 bpm, normal ECG. Personally reviewed in office.    Lab Results   Component Value Date    CHOL 198 2024    CHOL 226 (H) 2023    CHOL 192 2022     Lab Results   Component Value Date    HDL 73.4 2024    HDL 88.0 2023    HDL 58.7 2022     Lab Results   Component Value Date    LDLCALC 107 (H) 2024     Lab Results   Component Value Date    TRIG 88 2024    TRIG 78 2023    TRIG 157 (H) 2022     No components found for: \"CHOLHDL\"      Assessment/Plan   1. Hypertension:  Blood pressure " ambulatory range 120-130s mm Hg in the fall. Has not checked in last 3 months. Restart daily BP checks and keep log book.    Edema resolved with starting HCTZ 25 mg daily. Continue nifedipine ER 30 mg, atenolol 25 mg a day. HR increased to 54 bpm from 44 bpm with reduction of atenolol to 25 mg at end of 2019. HR trend 50-61 bpm in last 6 months on review of EMR vital signs.     2. Exertional dyspnea:  Has improved with blood pressure control and adding HCTZ 25 mg for mild diuresis. Likely due to hypertensive heart disease. Walking program helping with shortness of breath symptom management. Will observe. Alleviating lumbar spinal stenosis has improved peripheral dyspnea signaling due to increase work of movement while in pain.     3. Coronary arteriosclerosis / Dyslipidemia:  CT calcium score was 381 in 12/2018 with ROUSE 10 year risk score for MI of 21%. Continue atorvastatin 80 mg a day high intensity statin therapy. Repeat January 2024 LDL decreased to 107 from 2023 . Continue atorvastatin 80 mg a day and ezetimibe 10 mg a day. Near goal LDL < 100.     Repeat fasting lipid panel for 2025 ordered.    Lifestyle modification of low cholesterol diet and aerobic exercise (walking program) successful in 2022. Counseled to continue physical activity of 30 minutes 3 times a week for improved cardiopulmonary fitness.      Follow up with Dr. Hickman 6 months.            SIGNATURE: Negro Hickman MD PATIENT NAME: Melita Raya   DATE/TIME: January 16, 2025 11:16 AM MRN: [id number]

## 2025-01-16 NOTE — PATIENT INSTRUCTIONS
You were seen in the [facility] for your coronary arteriosclerosis and high cholesterol.     Your CT calcium score in 2018 showed a level of 381 which is moderate and shows you have some hardening of the heart arteries. To protect you from having a heart attack we are doin. Aspirin 81 mg a day for life  2. Atorvastatin 80 mg a day. This is strong cholesterol will help protect you from having a heart attack and remove cholesterol plaque from your heart arteries. Your 2024 cholesterol blood work shows that your LDL decreased to 107 from 122 in 2023 near our long term LDL goal for you of < 100.   3. Ezetimibe 10 mg a day for your cholesterol.    To control your blood pressure you are takin. Continue nifedipine ER 30 mg a day for blood pressure.   2. Continue hydrochlorothiazide 25 mg a day for blood pressure  3. Continue atenolol to 25 mg a day for blood pressure.  4. Continue losartan 50 mg a day for blood pressure.    Check your blood pressure every morning about 30-60 minutes after taking your morning blood pressure medications and keep a log book. Sit for 3-5 minutes in a chair to relax and place your arm on a table or counter to be level with your heart. Goal blood pressure range for you is 120-130 mm Hg.     Your echocardiogram from 2018 showed no major abnormalities. Your aortic valve has some mild calcium build up on it but is unchanged from 2014. This is unlikely to ever be a problem. Your heart murmur is soft. I do not recommend repeat imaging at this time.     Continue to eat a heart healthy diet with low saturated fat and cholesterol. Walk 30 minutes 3 times a week to protect your heart. Your lifestyle program changes in  resulted in well controlled blood pressure and cholesterol and 15 pounds of weight loss.    Follow up with Dr. Hickman in 6 months.

## 2025-01-17 LAB
ATRIAL RATE: 61 BPM
P AXIS: 78 DEGREES
P OFFSET: 166 MS
P ONSET: 132 MS
PR INTERVAL: 162 MS
Q ONSET: 213 MS
QRS COUNT: 9 BEATS
QRS DURATION: 78 MS
QT INTERVAL: 452 MS
QTC CALCULATION(BAZETT): 455 MS
QTC FREDERICIA: 454 MS
R AXIS: -36 DEGREES
T AXIS: 56 DEGREES
T OFFSET: 439 MS
VENTRICULAR RATE: 61 BPM

## 2025-02-24 DIAGNOSIS — J30.89 ENVIRONMENTAL AND SEASONAL ALLERGIES: ICD-10-CM

## 2025-02-26 LAB
ANION GAP SERPL CALCULATED.4IONS-SCNC: 14 MMOL/L (CALC) (ref 7–17)
BUN SERPL-MCNC: 26 MG/DL (ref 7–25)
BUN/CREAT SERPL: 16 (CALC) (ref 6–22)
CALCIUM SERPL-MCNC: 9.8 MG/DL (ref 8.6–10.4)
CHLORIDE SERPL-SCNC: 106 MMOL/L (ref 98–110)
CHOLEST SERPL-MCNC: 183 MG/DL
CHOLEST/HDLC SERPL: 2.7 (CALC)
CO2 SERPL-SCNC: 22 MMOL/L (ref 20–32)
CREAT SERPL-MCNC: 1.63 MG/DL (ref 0.6–0.95)
EGFRCR SERPLBLD CKD-EPI 2021: 31 ML/MIN/1.73M2
GLUCOSE SERPL-MCNC: 102 MG/DL (ref 65–99)
HDLC SERPL-MCNC: 67 MG/DL
LDLC SERPL CALC-MCNC: 99 MG/DL (CALC)
NONHDLC SERPL-MCNC: 116 MG/DL (CALC)
POTASSIUM SERPL-SCNC: 4.2 MMOL/L (ref 3.5–5.3)
SODIUM SERPL-SCNC: 142 MMOL/L (ref 135–146)
TRIGL SERPL-MCNC: 77 MG/DL

## 2025-02-26 RX ORDER — FLUTICASONE PROPIONATE 50 MCG
1 SPRAY, SUSPENSION (ML) NASAL DAILY
Qty: 16 G | Refills: 3 | Status: SHIPPED | OUTPATIENT
Start: 2025-02-26

## 2025-03-06 DIAGNOSIS — I10 PRIMARY HYPERTENSION: ICD-10-CM

## 2025-03-07 RX ORDER — NIFEDIPINE 30 MG/1
30 TABLET, FILM COATED, EXTENDED RELEASE ORAL DAILY
Qty: 90 TABLET | Refills: 1 | Status: SHIPPED | OUTPATIENT
Start: 2025-03-07

## 2025-03-24 DIAGNOSIS — I10 PRIMARY HYPERTENSION: ICD-10-CM

## 2025-03-24 RX ORDER — HYDROCHLOROTHIAZIDE 25 MG/1
25 TABLET ORAL DAILY
Qty: 90 TABLET | Refills: 3 | Status: SHIPPED | OUTPATIENT
Start: 2025-03-24

## 2025-04-22 ENCOUNTER — APPOINTMENT (OUTPATIENT)
Dept: PRIMARY CARE | Facility: CLINIC | Age: 85
End: 2025-04-22
Payer: MEDICARE

## 2025-04-22 VITALS
OXYGEN SATURATION: 96 % | SYSTOLIC BLOOD PRESSURE: 132 MMHG | DIASTOLIC BLOOD PRESSURE: 69 MMHG | HEART RATE: 50 BPM | BODY MASS INDEX: 29.94 KG/M2 | WEIGHT: 175.4 LBS | HEIGHT: 64 IN

## 2025-04-22 DIAGNOSIS — I10 PRIMARY HYPERTENSION: ICD-10-CM

## 2025-04-22 DIAGNOSIS — N18.31 CHRONIC KIDNEY DISEASE, STAGE 3A (MULTI): ICD-10-CM

## 2025-04-22 DIAGNOSIS — M25.471 ANKLE SWELLING, RIGHT: ICD-10-CM

## 2025-04-22 DIAGNOSIS — N17.9 AKI (ACUTE KIDNEY INJURY): Primary | ICD-10-CM

## 2025-04-22 PROCEDURE — 1159F MED LIST DOCD IN RCRD: CPT | Performed by: STUDENT IN AN ORGANIZED HEALTH CARE EDUCATION/TRAINING PROGRAM

## 2025-04-22 PROCEDURE — 99214 OFFICE O/P EST MOD 30 MIN: CPT | Performed by: STUDENT IN AN ORGANIZED HEALTH CARE EDUCATION/TRAINING PROGRAM

## 2025-04-22 PROCEDURE — 3078F DIAST BP <80 MM HG: CPT | Performed by: STUDENT IN AN ORGANIZED HEALTH CARE EDUCATION/TRAINING PROGRAM

## 2025-04-22 PROCEDURE — 1160F RVW MEDS BY RX/DR IN RCRD: CPT | Performed by: STUDENT IN AN ORGANIZED HEALTH CARE EDUCATION/TRAINING PROGRAM

## 2025-04-22 PROCEDURE — 3075F SYST BP GE 130 - 139MM HG: CPT | Performed by: STUDENT IN AN ORGANIZED HEALTH CARE EDUCATION/TRAINING PROGRAM

## 2025-04-22 PROCEDURE — 1157F ADVNC CARE PLAN IN RCRD: CPT | Performed by: STUDENT IN AN ORGANIZED HEALTH CARE EDUCATION/TRAINING PROGRAM

## 2025-04-22 PROCEDURE — 1036F TOBACCO NON-USER: CPT | Performed by: STUDENT IN AN ORGANIZED HEALTH CARE EDUCATION/TRAINING PROGRAM

## 2025-04-22 NOTE — PROGRESS NOTES
"Subjective   Patient ID: Melita Raya is a 84 y.o. female who presents for Follow-up (6 month follow-up. ).        HPI      83yo female with HTN, aortic valve stenosis, cholelithiasis, chronic open angle glaucoma, bilateral knee replacement, hyperlipidemia, and seasonal allergic rhinitis/sinusitis , ckd stage 3a/b      Pain and swelling of the right ankle on Friday with improvement as of today   Was limited in walking due to pain with weight bearing         Visit Vitals  /69   Pulse 50   Ht 1.626 m (5' 4\")   Wt 79.6 kg (175 lb 6.4 oz)   SpO2 96%   BMI 30.11 kg/m²   Smoking Status Never   BSA 1.9 m²      No LMP recorded.   Current Outpatient Medications   Medication Instructions    acetaminophen (TYLENOL 8 HOUR) 650 mg, Every 8 hours PRN    aspirin 81 mg EC tablet 1 tablet, Daily    atenolol (TENORMIN) 25 mg, oral, Daily    atorvastatin (LIPITOR) 80 mg, oral, Daily    cetirizine (ZYRTEC) 10 mg, oral, Every morning    cholecalciferol (VITAMIN D-3) 50 mcg, Daily    ezetimibe (ZETIA) 10 mg, oral, Daily    fluticasone (Flonase) 50 mcg/actuation nasal spray 1 spray, Each Nostril, Daily    hydroCHLOROthiazide (HYDRODIURIL) 25 mg, oral, Daily    losartan (COZAAR) 50 mg, oral, Daily    NIFEdipine ER (ADALAT CC) 30 mg, oral, Daily    timolol (Timoptic) 0.5 % ophthalmic solution 1 drop, Both Eyes, Daily, Instill one gtt o.u. qam      Social History     Tobacco Use    Smoking status: Never    Smokeless tobacco: Never   Substance Use Topics    Alcohol use: Yes     Alcohol/week: 4.0 standard drinks of alcohol     Types: 4 Glasses of wine per week        Review of Systems    Constitutional : No feeling poorly / fevers/ chills / night sweats/ fatigue   Cardiovascular : No CP /Palpitations/ lower extremity edema / syncope   Respiratory : No Cough /TEIXEIRA/Dyspnea at rest   MSK : As noted in HPI     All other systems have been reviewed and are negative for complaint       Physical Exam    Constitutional : Vitals reviewed. Alert " and in no distress  Cardiovascular : RRR, Normal S1, S2, no peripheral edema   Pulmonary: No respiratory distress, CTAB   MSK :  no  right ankle swelling / erythema today   Neurologic : CNs 2-12 grossly intact , no obvious FNDs  Psych : A,Ox3, normal mood and affect      Assessment/Plan   Diagnoses and all orders for this visit:  KASSIDY (acute kidney injury)  -     Basic Metabolic Panel; Future  Primary hypertension  Chronic kidney disease, stage 3a (Multi)  Ankle swelling, right  -     Uric Acid; Future      85yo female with HTN, aortic valve stenosis, cholelithiasis, chronic open angle glaucoma, bilateral knee replacement, hyperlipidemia, and seasonal allergic rhinitis/sinusitis , ckd stage 3a/b      Hypertension: Controlled.  BMP done in February showed KASSIDY.  Repeat BMP today to monitor.  Right ankle swelling: No swelling or erythema noted.  Possibility of gout discussed, uric acid levels ordered.    CKD stage III discussed, including causes.  Conditions addressed and mgmt as noted above.  Pertinent labs, images/ imaging reports , chart review was done .   Age appropriate labs / labs for mgmt of chronic medical conditions ordered, further mgmt pending the results.         RTO in  4m or sooner if needed . .        This note is intended for the physician writing it, as well as to communicate findings to other healthcare professionals. These notes use medical lexicon that may be misunderstood by non medical persons. Therefore, interpretations of medical notes and terminology should be approached with caution.

## 2025-04-23 LAB
ANION GAP SERPL CALCULATED.4IONS-SCNC: 10 MMOL/L (CALC) (ref 7–17)
BUN SERPL-MCNC: 30 MG/DL (ref 7–25)
BUN/CREAT SERPL: 22 (CALC) (ref 6–22)
CALCIUM SERPL-MCNC: 9.7 MG/DL (ref 8.6–10.4)
CHLORIDE SERPL-SCNC: 107 MMOL/L (ref 98–110)
CO2 SERPL-SCNC: 23 MMOL/L (ref 20–32)
CREAT SERPL-MCNC: 1.35 MG/DL (ref 0.6–0.95)
EGFRCR SERPLBLD CKD-EPI 2021: 39 ML/MIN/1.73M2
GLUCOSE SERPL-MCNC: 106 MG/DL (ref 65–139)
POTASSIUM SERPL-SCNC: 4.3 MMOL/L (ref 3.5–5.3)
SODIUM SERPL-SCNC: 140 MMOL/L (ref 135–146)
URATE SERPL-MCNC: 7 MG/DL (ref 2.5–7)

## 2025-05-12 ENCOUNTER — APPOINTMENT (OUTPATIENT)
Dept: DERMATOLOGY | Facility: CLINIC | Age: 85
End: 2025-05-12
Payer: MEDICARE

## 2025-05-12 DIAGNOSIS — L72.0 MILIA: Primary | ICD-10-CM

## 2025-05-12 DIAGNOSIS — Z12.83 SKIN EXAM, SCREENING FOR CANCER: ICD-10-CM

## 2025-05-12 PROCEDURE — 1159F MED LIST DOCD IN RCRD: CPT | Performed by: STUDENT IN AN ORGANIZED HEALTH CARE EDUCATION/TRAINING PROGRAM

## 2025-05-12 PROCEDURE — 99202 OFFICE O/P NEW SF 15 MIN: CPT | Performed by: STUDENT IN AN ORGANIZED HEALTH CARE EDUCATION/TRAINING PROGRAM

## 2025-05-12 ASSESSMENT — DERMATOLOGY QUALITY OF LIFE (QOL) ASSESSMENT
RATE HOW BOTHERED YOU ARE BY SYMPTOMS OF YOUR SKIN PROBLEM (EG, ITCHING, STINGING BURNING, HURTING OR SKIN IRRITATION): 0 - NEVER BOTHERED
ARE THERE EXCLUSIONS OR EXCEPTIONS FOR THE QUALITY OF LIFE ASSESSMENT: NO
RATE HOW EMOTIONALLY BOTHERED YOU ARE BY YOUR SKIN PROBLEM (FOR EXAMPLE, WORRY, EMBARRASSMENT, FRUSTRATION): 0 - NEVER BOTHERED
DATE THE QUALITY-OF-LIFE ASSESSMENT WAS COMPLETED: 67337
RATE HOW BOTHERED YOU ARE BY EFFECTS OF YOUR SKIN PROBLEMS ON YOUR ACTIVITIES (EG, GOING OUT, ACCOMPLISHING WHAT YOU WANT, WORK ACTIVITIES OR YOUR RELATIONSHIPS WITH OTHERS): 0 - NEVER BOTHERED

## 2025-05-12 ASSESSMENT — PATIENT GLOBAL ASSESSMENT (PGA): PATIENT GLOBAL ASSESSMENT: PATIENT GLOBAL ASSESSMENT:  1 - CLEAR

## 2025-05-12 ASSESSMENT — ITCH NUMERIC RATING SCALE: HOW SEVERE IS YOUR ITCHING?: 0

## 2025-05-12 ASSESSMENT — DERMATOLOGY PATIENT ASSESSMENT: DO YOU HAVE ANY NEW OR CHANGING LESIONS: NO

## 2025-05-12 NOTE — PROGRESS NOTES
Called [facility], spoke with Taya. She stated they already have the prescription on file and they submitted a prior auth to the insurance and they are waiting to hear back from them. She stated they will call us back once they hear from the insurance company.    Subjective     Melita Raya is a 84 y.o. female who presents for the following: Lesion (Rt Orbital area - Approx 1 Year, lesion has grown.).     Intake Questions  Do you have any new or changing Lesions?: No    Review of Systems:  No other skin or systemic complaints other than what is documented elsewhere in the note.    The following portions of the chart were reviewed this encounter and updated as appropriate:          Skin Cancer History  Biopsy Log Book  No skin cancers from Specimen Tracking.    Additional History      Specialty Problems    None       Objective   Well appearing patient in no apparent distress; mood and affect are within normal limits.    A focused skin examination was performed. All findings within normal limits unless otherwise noted below.    Assessment/Plan   Skin Exam  1. MILIA  Right Malar Cheek  White cystic papule  Benign,reassured  2. SKIN EXAM, SCREENING FOR CANCER      Related Procedures  Referral to Dermatology

## 2025-05-23 ENCOUNTER — OFFICE VISIT (OUTPATIENT)
Dept: OPHTHALMOLOGY | Facility: CLINIC | Age: 85
End: 2025-05-23
Payer: MEDICARE

## 2025-05-23 DIAGNOSIS — H25.13 AGE-RELATED NUCLEAR CATARACT OF BOTH EYES: Primary | ICD-10-CM

## 2025-05-23 DIAGNOSIS — H52.7 REFRACTION ERROR: ICD-10-CM

## 2025-05-23 PROCEDURE — 99213 OFFICE O/P EST LOW 20 MIN: CPT | Performed by: OPHTHALMOLOGY

## 2025-05-23 RX ORDER — LATANOPROST 50 UG/ML
1 SOLUTION/ DROPS OPHTHALMIC NIGHTLY
COMMUNITY

## 2025-05-23 ASSESSMENT — REFRACTION_WEARINGRX
OD_CYLINDER: +0.50
OD_SPHERE: +2.50
OS_ADD: +3.00
OD_AXIS: 020
OS_SPHERE: +2.75
OS_CYLINDER: SPHER
OD_ADD: +3.00

## 2025-05-23 ASSESSMENT — CONF VISUAL FIELD
OS_INFERIOR_NASAL_RESTRICTION: 0
OS_NORMAL: 1
OS_SUPERIOR_TEMPORAL_RESTRICTION: 0
OD_SUPERIOR_TEMPORAL_RESTRICTION: 0
OD_SUPERIOR_NASAL_RESTRICTION: 0
OD_INFERIOR_TEMPORAL_RESTRICTION: 0
OD_NORMAL: 1
OD_INFERIOR_NASAL_RESTRICTION: 0
METHOD: COUNTING FINGERS
OS_INFERIOR_TEMPORAL_RESTRICTION: 0
OS_SUPERIOR_NASAL_RESTRICTION: 0

## 2025-05-23 ASSESSMENT — REFRACTION_MANIFEST
OD_ADD: +3.00
OS_SPHERE: +2.25
OD_CYLINDER: +0.75
OD_AXIS: 003
OS_CYLINDER: +0.50
OS_ADD: +3.00
OS_AXIS: 125
OD_SPHERE: +1.75

## 2025-05-23 ASSESSMENT — ENCOUNTER SYMPTOMS
ALLERGIC/IMMUNOLOGIC NEGATIVE: 0
MUSCULOSKELETAL NEGATIVE: 0
RESPIRATORY NEGATIVE: 0
GASTROINTESTINAL NEGATIVE: 0
CONSTITUTIONAL NEGATIVE: 0
EYES NEGATIVE: 1
HEMATOLOGIC/LYMPHATIC NEGATIVE: 0
ENDOCRINE NEGATIVE: 0
NEUROLOGICAL NEGATIVE: 0
PSYCHIATRIC NEGATIVE: 0
CARDIOVASCULAR NEGATIVE: 0

## 2025-05-23 ASSESSMENT — VISUAL ACUITY
OD_CC: 20/40
OS_CC: 20/25
CORRECTION_TYPE: GLASSES
OD_PH_CC: 20/25
METHOD: SNELLEN - LINEAR

## 2025-05-23 ASSESSMENT — PACHYMETRY
OS_CT(UM): 539
OD_CT(UM): 543

## 2025-05-23 ASSESSMENT — EXTERNAL EXAM - RIGHT EYE: OD_EXAM: NORMAL

## 2025-05-23 ASSESSMENT — TONOMETRY
OD_IOP_MMHG: 18
IOP_METHOD: GOLDMANN APPLANATION
OS_IOP_MMHG: 18

## 2025-05-23 ASSESSMENT — SLIT LAMP EXAM - LIDS
COMMENTS: NORMAL
COMMENTS: NORMAL

## 2025-05-23 ASSESSMENT — EXTERNAL EXAM - LEFT EYE: OS_EXAM: NORMAL

## 2025-05-23 NOTE — ASSESSMENT & PLAN NOTE
New Rx for glasses/SCL given per patient request. Patient's signature obtained to acknowledge and confirm that a paper copy of glasses/SCL Rx was given to patient in compliance with [facility]. Electronic copy of Rx will also be available via [employer]/EPIC.

## 2025-05-23 NOTE — PATIENT INSTRUCTIONS
Thank you so much for choosing me to provide your care today!    If you were dilated your vision may remain blurry   or light sensitive for several hours.    The nature of eye and vision problems can require frequent follow up, please make every effort to adhere to any future appointments.    If you have any issues, questions, or concerns,   please do not hesitate to reach out.    If you receive a survey in regards to your care today, please mention any exceptional care my office staff and/or technicians provided.    You can reach our office at this number:    [phone]    Please consider signing up for and utilizing [email or web address]!  This is the best way to directly reach me or other  providers    Artificial Tears/lubricating drops  Recommendations for daily use      Refresh  [x]Refresh Tears []Refresh Advance  []Refresh Optive  []Refresh LiquiGel  []Refresh preservative free in single use vial    Systane  [x]Systane ultra []Systane Balance  []Systane Gel   []Systane preservative free in single use vial    Genteal  []Genteal  []Genteal gel    Blink  []Blink   []Blink for contacts      You may use these drops:  [x]1 drop 2-4 times daily  []1 drop 4 times daily  []1 drop every 1-2 hours or as needed  []At bedtime    It is OK to substitute generic store brand varieties of drops. Artificial tears work best when used consistently instead of as needed.    *Avoid any drops for allergy/itching/redness unless directed otherwise*

## 2025-05-23 NOTE — PROGRESS NOTES
Assessment/Plan   Problem List Items Addressed This Visit       Age-related nuclear cataract of both eyes - Primary    Non significant cataract noted on exam. Discussed the natural course of cataract and may require surgery at some point in the future. Will plan to continue to monitor with serial exam.            Refraction error    New Rx for glasses/SCL given per patient request. Patient's signature obtained to acknowledge and confirm that a paper copy of glasses/SCL Rx was given to patient in compliance with [facility] Eyeglass Rule. Electronic copy of Rx will also be available via [employer]/EPIC.               Provided reassurance regarding above diagnoses and care received in the office visit today. Discussed outcomes and options along with the importance of treatment compliance. Understands the importance of any follow up visits. Patient instructed to call/communicate with our office if any new issues, questions, or concerns.     Will plan to see back as scheduled with Dr. Arrieta for her glaucoma care or sooner PRN

## 2025-06-13 ENCOUNTER — APPOINTMENT (OUTPATIENT)
Dept: OPHTHALMOLOGY | Facility: CLINIC | Age: 85
End: 2025-06-13
Payer: MEDICARE

## 2025-07-08 ENCOUNTER — APPOINTMENT (OUTPATIENT)
Dept: OPHTHALMOLOGY | Facility: CLINIC | Age: 85
End: 2025-07-08
Payer: MEDICARE

## 2025-07-08 DIAGNOSIS — H40.10X2 OPEN-ANGLE GLAUCOMA OF BOTH EYES, MODERATE STAGE, UNSPECIFIED OPEN-ANGLE GLAUCOMA TYPE: ICD-10-CM

## 2025-07-08 DIAGNOSIS — H40.1132 PRIMARY OPEN ANGLE GLAUCOMA (POAG) OF BOTH EYES, MODERATE STAGE: Primary | ICD-10-CM

## 2025-07-08 LAB
AVERAGE RNFL BASELINE (OD): 42
AVERAGE RNFL BASELINE (OS): 57

## 2025-07-08 PROCEDURE — 92133 CPTRZD OPH DX IMG PST SGM ON: CPT | Performed by: OPHTHALMOLOGY

## 2025-07-08 PROCEDURE — 99214 OFFICE O/P EST MOD 30 MIN: CPT | Performed by: OPHTHALMOLOGY

## 2025-07-08 RX ORDER — LATANOPROST 50 UG/ML
1 SOLUTION/ DROPS OPHTHALMIC DAILY
Qty: 2.5 ML | Refills: 3 | Status: SHIPPED | OUTPATIENT
Start: 2025-07-08 | End: 2025-10-06

## 2025-07-08 RX ORDER — TIMOLOL MALEATE 5 MG/ML
1 SOLUTION/ DROPS OPHTHALMIC DAILY
Qty: 2.5 ML | Refills: 3 | Status: SHIPPED | OUTPATIENT
Start: 2025-07-08 | End: 2025-10-06

## 2025-07-08 ASSESSMENT — VISUAL ACUITY
OS_CC: 20/20-1
OD_CC: 20/30+2
CORRECTION_TYPE: GLASSES
METHOD: SNELLEN - LINEAR

## 2025-07-08 ASSESSMENT — SLIT LAMP EXAM - LIDS
COMMENTS: NORMAL
COMMENTS: NORMAL

## 2025-07-08 ASSESSMENT — PACHYMETRY
OD_CT(UM): 543
OS_CT(UM): 539

## 2025-07-08 ASSESSMENT — REFRACTION_WEARINGRX
OS_ADD: +3.00
OS_CYLINDER: +0.50
OD_ADD: +3.00
OD_AXIS: 003
OD_SPHERE: +1.75
OS_SPHERE: +2.25
OD_CYLINDER: +0.75
OS_AXIS: 125

## 2025-07-08 ASSESSMENT — EXTERNAL EXAM - RIGHT EYE: OD_EXAM: NORMAL

## 2025-07-08 ASSESSMENT — CUP TO DISC RATIO
OS_RATIO: 0.5
OD_RATIO: 0.45

## 2025-07-08 ASSESSMENT — TONOMETRY
OD_IOP_MMHG: 17
OS_IOP_MMHG: 17
IOP_METHOD: GOLDMANN APPLANATION

## 2025-07-08 ASSESSMENT — EXTERNAL EXAM - LEFT EYE: OS_EXAM: NORMAL

## 2025-07-08 NOTE — PROGRESS NOTES
1-oag stable on meds  2-sebaceous cyst od nasla refer oculoplastics  3-ns ou stable  4-lonny tears ou prn    Rto 6mos

## 2025-07-14 ENCOUNTER — TELEPHONE (OUTPATIENT)
Dept: SCHEDULING | Age: 85
End: 2025-07-14
Payer: MEDICARE

## 2025-07-14 DIAGNOSIS — E78.2 MIXED HYPERLIPIDEMIA: ICD-10-CM

## 2025-07-14 RX ORDER — EZETIMIBE 10 MG/1
10 TABLET ORAL DAILY
Qty: 90 TABLET | Refills: 3 | Status: SHIPPED | OUTPATIENT
Start: 2025-07-14

## 2025-07-17 ENCOUNTER — APPOINTMENT (OUTPATIENT)
Dept: CARDIOLOGY | Facility: CLINIC | Age: 85
End: 2025-07-17
Payer: MEDICARE

## 2025-07-17 VITALS
OXYGEN SATURATION: 98 % | HEIGHT: 64 IN | BODY MASS INDEX: 29.91 KG/M2 | SYSTOLIC BLOOD PRESSURE: 152 MMHG | WEIGHT: 175.19 LBS | HEART RATE: 50 BPM | DIASTOLIC BLOOD PRESSURE: 66 MMHG

## 2025-07-17 DIAGNOSIS — R93.1 AGATSTON CORONARY ARTERY CALCIUM SCORE BETWEEN 200 AND 399: ICD-10-CM

## 2025-07-17 DIAGNOSIS — I25.10 ARTERIOSCLEROTIC CORONARY ARTERY DISEASE: Primary | ICD-10-CM

## 2025-07-17 DIAGNOSIS — E78.2 MIXED HYPERLIPIDEMIA: ICD-10-CM

## 2025-07-17 DIAGNOSIS — I10 PRIMARY HYPERTENSION: ICD-10-CM

## 2025-07-17 PROCEDURE — 1036F TOBACCO NON-USER: CPT | Performed by: INTERNAL MEDICINE

## 2025-07-17 PROCEDURE — 3078F DIAST BP <80 MM HG: CPT | Performed by: INTERNAL MEDICINE

## 2025-07-17 PROCEDURE — 1159F MED LIST DOCD IN RCRD: CPT | Performed by: INTERNAL MEDICINE

## 2025-07-17 PROCEDURE — 1160F RVW MEDS BY RX/DR IN RCRD: CPT | Performed by: INTERNAL MEDICINE

## 2025-07-17 PROCEDURE — 3077F SYST BP >= 140 MM HG: CPT | Performed by: INTERNAL MEDICINE

## 2025-07-17 PROCEDURE — 1126F AMNT PAIN NOTED NONE PRSNT: CPT | Performed by: INTERNAL MEDICINE

## 2025-07-17 PROCEDURE — 99212 OFFICE O/P EST SF 10 MIN: CPT

## 2025-07-17 PROCEDURE — 99214 OFFICE O/P EST MOD 30 MIN: CPT | Performed by: INTERNAL MEDICINE

## 2025-07-17 PROCEDURE — G2211 COMPLEX E/M VISIT ADD ON: HCPCS | Performed by: INTERNAL MEDICINE

## 2025-07-17 RX ORDER — LOSARTAN POTASSIUM 100 MG/1
100 TABLET ORAL DAILY
Qty: 90 TABLET | Refills: 3 | Status: SHIPPED | OUTPATIENT
Start: 2025-07-17 | End: 2025-07-17 | Stop reason: SDUPTHER

## 2025-07-17 RX ORDER — LOSARTAN POTASSIUM 100 MG/1
100 TABLET ORAL DAILY
Qty: 90 TABLET | Refills: 3 | Status: SHIPPED | OUTPATIENT
Start: 2025-07-17

## 2025-07-17 ASSESSMENT — ENCOUNTER SYMPTOMS
LOSS OF SENSATION IN FEET: 0
OCCASIONAL FEELINGS OF UNSTEADINESS: 0
DEPRESSION: 0

## 2025-07-17 ASSESSMENT — COLUMBIA-SUICIDE SEVERITY RATING SCALE - C-SSRS
6. HAVE YOU EVER DONE ANYTHING, STARTED TO DO ANYTHING, OR PREPARED TO DO ANYTHING TO END YOUR LIFE?: NO
1. IN THE PAST MONTH, HAVE YOU WISHED YOU WERE DEAD OR WISHED YOU COULD GO TO SLEEP AND NOT WAKE UP?: NO
2. HAVE YOU ACTUALLY HAD ANY THOUGHTS OF KILLING YOURSELF?: NO

## 2025-07-17 ASSESSMENT — PAIN SCALES - GENERAL: PAINLEVEL_OUTOF10: 0-NO PAIN

## 2025-07-17 NOTE — PATIENT INSTRUCTIONS
You were seen in the [facility] for your coronary arteriosclerosis and high cholesterol.     Your CT calcium score in 2018 showed a level of 381 which is moderate and shows you have some hardening of the heart arteries. To protect you from having a heart attack we are doin. Aspirin 81 mg a day for life  2. Atorvastatin 80 mg a day. This is strong cholesterol will help protect you from having a heart attack and remove cholesterol plaque from your heart arteries. Your 2025 cholesterol blood work shows that your LDL decreased to 99 from 122 in 2023. You are now at your long term LDL goal< 100.   3. Ezetimibe 10 mg a day for your cholesterol.    To control your blood pressure you are takin. Continue nifedipine ER 30 mg a day for blood pressure.   2. Continue hydrochlorothiazide 25 mg a day for blood pressure  3. Continue atenolol to 25 mg a day for blood pressure.  4. INCREASE losartan to 100 mg a day (you can take 2 50 mg tablets a day until your bottle runs out) for blood pressure.    Check your blood pressure every morning about 30-60 minutes after taking your morning blood pressure medications and keep a log book. Sit for 3-5 minutes in a chair to relax and place your arm on a table or counter to be level with your heart. Goal blood pressure range for you is 120-130 mm Hg.     Your echocardiogram from 2018 showed no major abnormalities. Your aortic valve has some mild calcium build up on it but is unchanged from 2014. This is unlikely to ever be a problem. Your heart murmur is soft. I do not recommend repeat imaging at this time.     Continue to eat a heart healthy diet with low saturated fat and cholesterol. Walk 30 minutes 3 times a week to protect your heart. Your lifestyle program changes in  resulted in well controlled blood pressure and cholesterol and 15 pounds of weight loss.    Follow up with Dr. Hickman in 6 months.

## 2025-07-17 NOTE — PROGRESS NOTES
Subjective   Melita Raya is a 84 y.o. female who presents to the [facility] for follow up of dyslipidemia, hypertension and aortic valve sclerosis. Last seen in 2024.     Since last visit, Ms. Raya has stable cardiac status with CHF symptoms (PND, orthopnea), chest pain, palpitations, claudication, or syncope.     Mobility improved after 2024 L4-L5 spinal surgery.    She notes less exertional dyspnea after completing recovery and physical therapy from back surgery.     Now home BP trend was 130s-150s/70s mm Hg in the last 3 months.     Has recent right foot discomfort from gout. Not on gout medications at this time. Using Tylenol.    2018 CT calcium score of 381. ROUSE 10 year risk score of 21% for MI based on risk factors at that time. We increased atorvastatin to 80 mg a day in 2019. Blood pressure at goal on current medications. Her ankle edema has resolved since starting HCTZ 25 mg a day.  has stable exertional dyspnea with climbing stairs. Has done less walking since February due to bereavement from her daughter's death.      Normal LV function on echo 2018 with stable mild aortic sclerosis without stenosis.     Past Medical History:  1. Coronary arteriosclerosis: 2018 CT calcium score of 381. ROUSE 10 year risk score of 21%  2. Hypertension  3. Dyslipidemia: 2019 lipids (atorvastatin 80 mg):  / TG 84 / HDL 71 / . Prior use of ezetimibe did not lower LDL level. Prior use of simvastatin 40 mg and 80 mg caused increase in LFTs.   4. Osteoarthritis     Past Surgical History:  1. Right knee replacement      Social History:  Quit tobacco 40 years ago. Retired from  at [employer].     Family History:  Mother and 2 sisters with CAD prior to 66 yo. Mother  at 61 yo of MI. Sister had stent 7 years ago.     Review of Systems    A 14 point review of systems was asked. All questions were  negative except for pertinent positives listed in the HPI.     Current Outpatient Medications on File Prior to Visit   Medication Sig Dispense Refill    acetaminophen (Tylenol 8 HOUR) 650 mg ER tablet Take 1 tablet (650 mg) by mouth every 8 hours if needed for mild pain (1 - 3). Do not crush, chew, or split.      aspirin 81 mg EC tablet Take 1 tablet (81 mg) by mouth once daily.      atenolol (Tenormin) 25 mg tablet Take 1 tablet (25 mg) by mouth once daily. 90 tablet 3    atorvastatin (Lipitor) 80 mg tablet TAKE 1 TABLET(80 MG) BY MOUTH EVERY DAY 90 tablet 3    cetirizine (ZyrTEC) 10 mg tablet Take 1 tablet (10 mg) by mouth once daily in the morning. 90 tablet 3    cholecalciferol (Vitamin D-3) 50 MCG (2000 UT) tablet Take 1 tablet (50 mcg) by mouth once daily.      ezetimibe (Zetia) 10 mg tablet Take 1 tablet (10 mg) by mouth once daily. 90 tablet 3    fluticasone (Flonase) 50 mcg/actuation nasal spray Administer 1 spray into each nostril once daily. 16 g 3    hydroCHLOROthiazide (HYDRODiuril) 25 mg tablet Take 1 tablet (25 mg) by mouth once daily. 90 tablet 3    latanoprost (Xalatan) 0.005 % ophthalmic solution Administer 1 drop into both eyes early in the morning.. 2.5 mL 3    losartan (Cozaar) 50 mg tablet Take 1 tablet (50 mg) by mouth once daily. 90 tablet 1    NIFEdipine ER (Adalat CC) 30 mg 24 hr tablet Take 1 tablet (30 mg) by mouth once daily. 90 tablet 1    timolol (Timoptic) 0.5 % ophthalmic solution Administer 1 drop into both eyes once daily. Instill one gtt o.u. qam 2.5 mL 3    [DISCONTINUED] ezetimibe (Zetia) 10 mg tablet Take 1 tablet (10 mg) by mouth once daily. 90 tablet 3     No current facility-administered medications on file prior to visit.      Objective   Physical Exam  BP Readings from Last 3 Encounters:   07/17/25 152/66   04/22/25 132/69   01/16/25 148/68      Wt Readings from Last 3 Encounters:   07/17/25 79.5 kg (175 lb 3 oz)   04/22/25 79.6 kg (175 lb 6.4 oz)   01/16/25 79.7 kg (175  "lb 11.2 oz)      BMI: Estimated body mass index is 30.07 kg/m² as calculated from the following:    Height as of this encounter: 1.626 m (5' 4\").    Weight as of this encounter: 79.5 kg (175 lb 3 oz).  BSA: Estimated body surface area is 1.89 meters squared as calculated from the following:    Height as of this encounter: 1.626 m (5' 4\").    Weight as of this encounter: 79.5 kg (175 lb 3 oz).    General: no acute distress  HEENT: EOMI, no scleral icterus.  Lungs: Clear to auscultation bilaterally without wheezing, rales, or rhonchi.  Cardiovascular: Regular rhythm and rate. Normal S1 and S2. No murmurs, rubs, or gallops are appreciated. JVP normal.  Abdomen: Soft, nontender, nondistended. Bowel sounds present.  Extremities: Warm and well perfused with equal 2+ pulses bilaterally.  No edema present.  Neurologic: Alert and oriented x3.    I have personally reviewed the following images and laboratory findings:  Last echocardiogram:   Most recent echo, 1/10/2018: LV EF 60-65%, impaired relaxation diastology (E/e' 15), normal LA size (MARTIN 21), normal RV/RA, aortic valve sclerosis, trivial AI, mild MAC, trace MR, trace TR. RVSP not estimated (RA pressure ~ 3 mm Hg with normal IVC size and respiratory collapse). No changes from 2014    Last cath / stress test / CACS:   2018 CT calcium score of 381. ROUSE 10 year risk score of 21%    Most recent EC2024 ECG: Sinus rhythm, 50 bpm, normal ECG. Personally reviewed in office.    Lab Results   Component Value Date    CHOL 183 2025    CHOL 198 2024    CHOL 226 (H) 2023     Lab Results   Component Value Date    HDL 67 2025    HDL 73.4 2024    HDL 88.0 2023     Lab Results   Component Value Date    LDLCALC 99 2025    LDLCALC 107 (H) 2024     Lab Results   Component Value Date    TRIG 77 2025    TRIG 88 2024    TRIG 78 2023     No components found for: \"CHOLHDL\"      Assessment/Plan   1. " Hypertension:  Blood pressure ambulatory range 130-150s mm Hg in the last 3 months. Increase losartan to 100 mg a day. Continue daily BP checks and keep log book.    Edema resolved with starting HCTZ 25 mg daily. Continue nifedipine ER 30 mg, atenolol 25 mg a day. HR increased to 54 bpm from 44 bpm with reduction of atenolol to 25 mg from 50 mg a day at end of 2019. HR trend 50-60 bpm in last 6 months on review of EMR vital signs.     2. Exertional dyspnea:  Has improved with blood pressure control and adding HCTZ 25 mg for mild diuresis. Likely due to hypertensive heart disease. Walking program helping with shortness of breath symptom management. Will observe. Alleviating lumbar spinal stenosis has improved peripheral dyspnea signaling due to increase work of movement while in pain.     3. Coronary arteriosclerosis / Dyslipidemia:  CT calcium score was 381 in 12/2018 with ROUSE 10 year risk score for MI of 21%.     February 2025 repeat fasting lipid panel improved compared to 2024 with LDL level 99 now at goal LDL < 100. Continue atorvastatin 80 mg a day and ezetimibe 10 mg a day.    Lifestyle modification of low cholesterol diet and aerobic exercise (walking program) successful in 2022. Counseled to continue physical activity of 30 minutes 3 times a week for improved cardiopulmonary fitness.      Follow up with Dr. iHckman 6 months.            SIGNATURE: Negro Hickman MD PATIENT NAME: Melita Raya   DATE/TIME: July 17, 2025 11:55 AM MRN: [id number]

## 2025-08-20 ENCOUNTER — APPOINTMENT (OUTPATIENT)
Dept: OPHTHALMOLOGY | Facility: CLINIC | Age: 85
End: 2025-08-20
Payer: MEDICARE

## 2025-08-21 DIAGNOSIS — J30.89 ENVIRONMENTAL AND SEASONAL ALLERGIES: ICD-10-CM

## 2025-08-25 RX ORDER — FLUTICASONE PROPIONATE 50 MCG
1 SPRAY, SUSPENSION (ML) NASAL DAILY
Qty: 16 G | Refills: 3 | Status: SHIPPED | OUTPATIENT
Start: 2025-08-25

## 2025-10-01 ENCOUNTER — APPOINTMENT (OUTPATIENT)
Dept: OPHTHALMOLOGY | Facility: CLINIC | Age: 85
End: 2025-10-01
Payer: MEDICARE

## 2025-10-22 ENCOUNTER — APPOINTMENT (OUTPATIENT)
Dept: PRIMARY CARE | Facility: CLINIC | Age: 85
End: 2025-10-22
Payer: MEDICARE

## 2026-01-12 ENCOUNTER — APPOINTMENT (OUTPATIENT)
Dept: OPHTHALMOLOGY | Facility: CLINIC | Age: 86
End: 2026-01-12
Payer: MEDICARE

## (undated) DEVICE — ELECTRODE, ELECTROSURGICAL, BLADE, INSULATED, ENT/IMA, STERILE

## (undated) DEVICE — KIT, MINOR, DOUBLE BASIN

## (undated) DEVICE — Device

## (undated) DEVICE — DRAPE, INCISE, ANTIMICROBIAL, IOBAN 2, LARGE, 17 X 23 IN, DISPOSABLE, STERILE

## (undated) DEVICE — ADHESIVE, SKIN, LIQUIBAND EXCEED

## (undated) DEVICE — NEEDLE, HYPODERMIC, 25 G X 1.5 IN, A BEVEL, STERILE

## (undated) DEVICE — DRILL, NEURO, PRECISION, 3MM X 3.8MM, CARBIDE

## (undated) DEVICE — DRAPE, INSTRUMENT, W/POUCH, STERI DRAPE, 7 X 11 IN, DISPOSABLE, STERILE

## (undated) DEVICE — DRAPE, MICROSCOPE, FOR ZEISS 65MM, VARI-LENS II, 52 X 150

## (undated) DEVICE — SUTURE, VICRYL 0, 36 IN, CT-1, VIOLET

## (undated) DEVICE — DRAPE, C-ARM, W/12 IN COVER, LI XTRAY TUBE

## (undated) DEVICE — TUBING, SUCTION, NON-CONDUCTIVE, W/CONNECT,.25 IN X 12 FT, STERILE, LF

## (undated) DEVICE — APPLICATOR, CHLORAPREP, W/ORANGE TINT, 26ML

## (undated) DEVICE — SUTURE, POLYSORB, 2-0, 18 IN, GS23, DETACHABLE, MULTIPACK, UNDYED

## (undated) DEVICE — PAD, GROUNDING, ELECTROSURGICAL, W/9 FT CABLE, POLYHESIVE II, ADULT, LF

## (undated) DEVICE — TIP,  ELECTRODE COATED INSULATED, EXTENDED, LF

## (undated) DEVICE — FLOSEAL, MATRIX, HEMOSTATIC, FULL STERILE PREP, 5ML

## (undated) DEVICE — SUTURE, MONOCRYL, 4-0, 18 IN, PS2, UNDYED